# Patient Record
Sex: FEMALE | Race: WHITE | Employment: FULL TIME | URBAN - METROPOLITAN AREA
[De-identification: names, ages, dates, MRNs, and addresses within clinical notes are randomized per-mention and may not be internally consistent; named-entity substitution may affect disease eponyms.]

---

## 2019-07-03 ENCOUNTER — DOCUMENTATION ONLY (OUTPATIENT)
Dept: SURGERY | Facility: CLINIC | Age: 34
End: 2019-07-03

## 2019-07-03 NOTE — PROGRESS NOTES
ONCOLOGY INTAKE: Records Information      APPT INFORMATION:  Referring provider:  Dr. Humphrey Aranda  Referring provider s clinic:  [facility] Neurosurgery/ortho Spine  Reason for visit/diagnosis:  Schwanoma    ADDITIONAL INFORMATION:  Staff message sent to Aranza WILLIS with thoracic surgery to review records and provide scheduling recommendations.    Notes are in care everywhere also received via fax.  Please fax referral to oncology CSS team upon scheduling.

## 2019-07-09 ENCOUNTER — TELEPHONE (OUTPATIENT)
Dept: SURGERY | Facility: CLINIC | Age: 34
End: 2019-07-09

## 2019-07-09 DIAGNOSIS — D36.10 SCHWANNOMA: Primary | ICD-10-CM

## 2019-07-09 NOTE — TELEPHONE ENCOUNTER
RECORDS STATUS - ALL OTHER DIAGNOSIS      RECORDS RECEIVED FROM: [employer] (Pre[place]Visit says [facility], pt treats w/ Dr. Aranda @ , does not treat at [facility])   DATE RECEIVED:    NOTES STATUS DETAILS   OFFICE NOTE from referring provider Viola/BRANDI Aranda - BRANDI,    OFFICE NOTE from medical oncologist NA    DISCHARGE SUMMARY from hospital NA    DISCHARGE REPORT from the ER NA    OPERATIVE REPORT NA    MEDICATION LIST [facility]    CLINICAL TRIAL TREATMENTS TO DATE     LABS     PATHOLOGY REPORTS NA    ANYTHING RELATED TO DIAGNOSIS Epic 5/14/19   GENONOMIC TESTING     TYPE:     IMAGING (NEED IMAGES & REPORT)     [facility] SC[facility]    [facility], Report in CE - Requested Thoracic Spine: 5/29/19  Cervical Spine: 5/20/19   MAMMO NA    ULTRASOUND NA    PET NA    [facility], Report in CE - Requested Cervical Spine: 5/15/19  Shoulder Lt: 5/14/19  Chest: 5/14/19   EMG [facility] - [facility] 6/4/19   PFT Scheduled 7/17/19 @ [facility]

## 2019-07-09 NOTE — TELEPHONE ENCOUNTER
ONCOLOGY INTAKE: Records Information      APPT INFORMATION:  Referring provider:  Dr. Humphrey Aranda MD  Referring provider s clinic:  [facility]  Reason for visit/diagnosis:  Schwannoma  Has patient been notified of appointment date and time?: no, left message    RECORDS INFORMATION:  Were the records received with the referral (via [employer])? yes    ADDITIONAL INFORMATION:  Per staff message from Aranza WILLIS:  Aranza León APRN CNS [place], [place]             Patient can see one of the thoracic surgeons but will need a chest CT and PFTs prior.     Thanks   Aranza      Left message for patient to call back and schedule appointment.  Can get all three appointments completed on 7/17/2019 if calls back prior to then.  Please forward records/referral to Oncology CSS upon scheduling.

## 2019-07-09 NOTE — TELEPHONE ENCOUNTER
ONCOLOGY INTAKE: Records Information      APPT INFORMATION:  Referring provider:  Dr. Humphrey Aranda MD  Referring provider s clinic:  [facility]  Reason for visit/diagnosis:  Schwannoma  Has patient been notified of appointment date and time?: yes, per pt     RECORDS INFORMATION:  Were the records received with the referral (via [employer])? yes    Has patient been seen for any external appt for this diagnosis? yes    If yes, where? [facility]    Has patient had any imaging or procedures outside of [place] for this condition? na      If Yes, where? na    ADDITIONAL INFORMATION:  Per Aranza, pt needs a CT and PFT prior to appointment.  Appointment all scheduled and patient is aware of locations and times.  Referral forwarded to Oncology CSS team.

## 2019-07-16 NOTE — PROGRESS NOTES
THORACIC SURGERY - NEW PATIENT OFFICE VISIT     Dear Dr. Dilcia Fragoso,    I saw Ms. Ornelas at Dr. 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    HPI  Ms. Keri Ornelas is a 33 year-old female who presented with a 2-year history of intermittent left chest and inner arm pain and paresthesias. A CT scan was obtained which reveal a left apical tumor in the posterior sulcus. MRI was consistent with possible Schwannoma. She was referred for consideration of resection.     Previsit Tests   5/29/19 Thoracic MRI: Unable to see images.    Well-circumscribed 2.0 x 4.2 x 1.8 cm heterogenously enhancing mass lateral to the left T2-T3 neural foramen of the left thoracic apex, with appearance most suggestive of schwannoma with cystic degeneration.    7/17/2019 CT chest abdomen pelvis        PFTs 7/17/2019  FEV1: 3.48L, 111%  DLCO: 32.68, 149%  FEV1/FVC: 90%    PMH  Anxiety   Asthma   Deviated septum   Post-infectious glomerulonephritis 2008   Psoriasis   Seasonal allergies     PSH  ETOH socially  TOB never    Physical examination  BMI 24    From a personal perspective, she is a  at [employer]. She is here alone and lives in [place], [place].    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 I had a lengthy discussion with Ms Ornelas about her likely diagnosis and treatment options. I explained that given the imaging characteristics highly suggestive of Schwannoma and her symptoms, I would favor to proceed with resection at this time. It appears that the tumor does not involve the neural foramen however we are unable to see the images. I have requested an overread of the outside MRI by our radiologist. She is also scheduled to see Neurosurgery within our system. We talked about the rationale for surgery, the alternatives risks and benefits. We  also discussed that even after resection of the tumor, her chest and arm pain may not completely disappear. We talked about the expected hospital stay and potential complications including but not limited to bleeding, infection, damage to surrounding structures and need for reoperation. Informed consent was obtained and she agreed to proceed.     PLAN  I spent a total of 60 minutes with Ms. Ornelas, more than 50% of which were spent in counseling, coordination of care, and face-to-face time. I reviewed the plan as follows:  1) Provided there is no involvement of neural foramen proceed with planned surgery: Left VATS, resection of posterior sulcus tumor.   2) Necessary tests and appointments: Outside MRI overread. F/U with Neurosurgery as scheduled. PAC.  3) Anticoagulation plan: Post op enoxaparin.   4) Regional anesthesia plan: ES block vs paravertebral catheter.   All questions were answered and Keri Ornelas and present family were in agreement with the plan.  I appreciate the opportunity to participate in the care of your patient and will keep you updated.  Sincerely,

## 2019-07-17 ENCOUNTER — ANCILLARY PROCEDURE (OUTPATIENT)
Dept: CT IMAGING | Facility: CLINIC | Age: 34
End: 2019-07-17
Attending: CLINICAL NURSE SPECIALIST
Payer: COMMERCIAL

## 2019-07-17 ENCOUNTER — PRE VISIT (OUTPATIENT)
Dept: SURGERY | Facility: CLINIC | Age: 34
End: 2019-07-17

## 2019-07-17 ENCOUNTER — OFFICE VISIT (OUTPATIENT)
Dept: SURGERY | Facility: CLINIC | Age: 34
End: 2019-07-17
Attending: THORACIC SURGERY (CARDIOTHORACIC VASCULAR SURGERY)
Payer: COMMERCIAL

## 2019-07-17 VITALS
OXYGEN SATURATION: 97 % | HEART RATE: 79 BPM | DIASTOLIC BLOOD PRESSURE: 68 MMHG | SYSTOLIC BLOOD PRESSURE: 110 MMHG | HEIGHT: 63 IN | BODY MASS INDEX: 24.47 KG/M2 | RESPIRATION RATE: 16 BRPM | TEMPERATURE: 98.9 F | WEIGHT: 138.1 LBS

## 2019-07-17 DIAGNOSIS — D36.10 SCHWANNOMA: ICD-10-CM

## 2019-07-17 DIAGNOSIS — D36.10 SCHWANNOMA: Primary | ICD-10-CM

## 2019-07-17 PROCEDURE — G0463 HOSPITAL OUTPT CLINIC VISIT: HCPCS | Mod: ZF

## 2019-07-17 RX ORDER — CELECOXIB 100 MG/1
CAPSULE ORAL
COMMUNITY
Start: 2019-01-15 | End: 2019-07-30

## 2019-07-17 RX ORDER — GABAPENTIN 300 MG/1
CAPSULE ORAL
COMMUNITY
Start: 2019-05-14 | End: 2019-09-11

## 2019-07-17 RX ORDER — SPIRONOLACTONE 50 MG/1
100 TABLET, FILM COATED ORAL AT BEDTIME
COMMUNITY

## 2019-07-17 RX ORDER — SULFAMETHOXAZOLE/TRIMETHOPRIM 800-160 MG
TABLET ORAL
COMMUNITY
Start: 2019-01-21 | End: 2019-07-30

## 2019-07-17 RX ORDER — HYDROCODONE BITARTRATE AND ACETAMINOPHEN 5; 325 MG/1; MG/1
TABLET ORAL
COMMUNITY
Start: 2019-01-15 | End: 2019-07-30

## 2019-07-17 RX ORDER — KETOCONAZOLE 20 MG/ML
SHAMPOO TOPICAL
COMMUNITY
Start: 2019-05-09

## 2019-07-17 ASSESSMENT — MIFFLIN-ST. JEOR: SCORE: 1310.55

## 2019-07-17 ASSESSMENT — PAIN SCALES - GENERAL: PAINLEVEL: SEVERE PAIN (6)

## 2019-07-17 NOTE — NURSING NOTE
Oncology Rooming Note    July 17, 2019 3:06 PM   Keri Ornelas is a 33 year old female who presents for:    Chief Complaint   Patient presents with     Oncology Clinic Visit     Complete AEH schwannoma      Initial Vitals: There were no vitals taken for this visit. There is no height or weight on file to calculate BMI. There is no height or weight on file to calculate BSA.  Data Unavailable Comment: Data Unavailable   No LMP recorded.  Allergies reviewed: Yes  Medications reviewed: Yes    Medications: Medication refills not needed today.  Pharmacy name entered into EPIC: Data Unavailable    Clinical concerns: none        Melissa Ulisses, CMA

## 2019-07-18 ENCOUNTER — TELEPHONE (OUTPATIENT)
Dept: SURGERY | Facility: CLINIC | Age: 34
End: 2019-07-18

## 2019-07-18 NOTE — TELEPHONE ENCOUNTER
"I tried to reach patient, left VM with my direct # to call.   Will await her call back; I spoke to Dr. Lutz, it was decided to ask Dr. Jenaro Little (neurosurgery) to review MRI and give us his opinion regarding foramen involvement.   Epic message sent, will await his input.    Addendum:   Dr. Little reviewed images, sent message stating:  \"I think it is all extra-foraminal. I don't think you would need us at all.\"       Message shared with Dr. Lutz, and he gave approval to proceed with scheduling her procedure in OR soon, will see PAC prior.   I sent a message to our OR  and placed needed orders.    "

## 2019-07-19 LAB
DLCOUNC-%PRED-PRE: 149 %
DLCOUNC-PRE: 32.68 ML/MIN/MMHG
DLCOUNC-PRED: 21.81 ML/MIN/MMHG
ERV-%PRED-PRE: 25 %
ERV-PRE: 0.3 L
ERV-PRED: 1.18 L
EXPTIME-PRE: 4.59 SEC
FEF2575-%PRED-PRE: 135 %
FEF2575-PRE: 4.72 L/SEC
FEF2575-PRED: 3.48 L/SEC
FEFMAX-%PRED-PRE: 121 %
FEFMAX-PRE: 8.4 L/SEC
FEFMAX-PRED: 6.89 L/SEC
FEV1-%PRED-PRE: 111 %
FEV1-PRE: 3.48 L
FEV1FEV6-PRE: 91 %
FEV1FEV6-PRED: 85 %
FEV1FVC-PRE: 90 %
FEV1FVC-PRED: 85 %
FEV1SVC-PRE: 105 %
FEV1SVC-PRED: 82 %
FIFMAX-PRE: 4.78 L/SEC
FRCPLETH-%PRED-PRE: 97 %
FRCPLETH-PRE: 2.57 L
FRCPLETH-PRED: 2.63 L
FVC-%PRED-PRE: 104 %
FVC-PRE: 3.85 L
FVC-PRED: 3.69 L
IC-%PRED-PRE: 115 %
IC-PRE: 3 L
IC-PRED: 2.6 L
RVPLETH-%PRED-PRE: 161 %
RVPLETH-PRE: 2.26 L
RVPLETH-PRED: 1.4 L
TLCPLETH-%PRED-PRE: 115 %
TLCPLETH-PRE: 5.56 L
TLCPLETH-PRED: 4.81 L
VA-%PRED-PRE: 109 %
VA-PRE: 5.21 L
VC-%PRED-PRE: 87 %
VC-PRE: 3.3 L
VC-PRED: 3.78 L

## 2019-07-24 DIAGNOSIS — D36.10 SCHWANNOMA: Primary | ICD-10-CM

## 2019-07-26 NOTE — TELEPHONE ENCOUNTER
Action 7/26/2019 1:14pm  PP   Action Taken Called pt and LVM about recs.      Action 7/26/2019 1:16pm  PP   Action Taken Faxed request for recs to [facility].      Action 7/29/2019 8:48am  PP   Action Taken Pt LVM at 2:36pm on 7/26. No PCP. No other recs to collect.      Action 7/29/2019 2:04pm  PP   Action Taken Roberto received recs from [facility] and sent to [facility].        FUTURE VISIT INFORMATION      SURGERY INFORMATION:    Date: 8/8/2019    Location: [facility] OR    Surgeon:  Dr. Delmar Sim     Anesthesia Type:  General     RECORDS REQUESTED FROM:       Pertinent Medical History:  Asthma     Most recent EKG+ Tracing:  [facility] 4/21/2016 - Received    Most recent PFT's:  7/17/2019     Most recent Sleep Study:  [facility] 3/20/2013 - Received

## 2019-07-29 ENCOUNTER — TELEPHONE (OUTPATIENT)
Dept: SURGERY | Facility: CLINIC | Age: 34
End: 2019-07-29

## 2019-07-29 NOTE — TELEPHONE ENCOUNTER
Called patient to re-schedule procedure with Dr. Delmar Sim, there was no answer.  Left message with my direct line [phone].

## 2019-07-30 ENCOUNTER — OFFICE VISIT (OUTPATIENT)
Dept: SURGERY | Facility: CLINIC | Age: 34
End: 2019-07-30
Payer: COMMERCIAL

## 2019-07-30 ENCOUNTER — ANESTHESIA EVENT (OUTPATIENT)
Dept: SURGERY | Facility: CLINIC | Age: 34
DRG: 497 | End: 2019-07-30
Payer: COMMERCIAL

## 2019-07-30 ENCOUNTER — PRE VISIT (OUTPATIENT)
Dept: SURGERY | Facility: CLINIC | Age: 34
End: 2019-07-30

## 2019-07-30 VITALS
WEIGHT: 132 LBS | SYSTOLIC BLOOD PRESSURE: 124 MMHG | RESPIRATION RATE: 14 BRPM | HEART RATE: 83 BPM | DIASTOLIC BLOOD PRESSURE: 86 MMHG | HEIGHT: 63 IN | BODY MASS INDEX: 23.39 KG/M2 | TEMPERATURE: 98.3 F | OXYGEN SATURATION: 99 %

## 2019-07-30 DIAGNOSIS — D36.10 SCHWANNOMA: Primary | ICD-10-CM

## 2019-07-30 DIAGNOSIS — D36.10 SCHWANNOMA: ICD-10-CM

## 2019-07-30 LAB
ANION GAP SERPL CALCULATED.3IONS-SCNC: 3 MMOL/L (ref 3–14)
B-HCG SERPL-ACNC: <1 IU/L (ref 0–5)
BLD PROD TYP BPU: NORMAL
BLD PROD TYP BPU: NORMAL
BLD UNIT ID BPU: 0
BLD UNIT ID BPU: 0
BLOOD PRODUCT CODE: NORMAL
BLOOD PRODUCT CODE: NORMAL
BPU ID: NORMAL
BPU ID: NORMAL
BUN SERPL-MCNC: 20 MG/DL (ref 7–30)
CALCIUM SERPL-MCNC: 8.9 MG/DL (ref 8.5–10.1)
CHLORIDE SERPL-SCNC: 104 MMOL/L (ref 94–109)
CO2 SERPL-SCNC: 30 MMOL/L (ref 20–32)
CREAT SERPL-MCNC: 0.86 MG/DL (ref 0.52–1.04)
ERYTHROCYTE [DISTWIDTH] IN BLOOD BY AUTOMATED COUNT: 11.8 % (ref 10–15)
GFR SERPL CREATININE-BSD FRML MDRD: 90 ML/MIN/{1.73_M2}
GLUCOSE SERPL-MCNC: 96 MG/DL (ref 70–99)
HCT VFR BLD AUTO: 42.9 % (ref 35–47)
HGB BLD-MCNC: 14.6 G/DL (ref 11.7–15.7)
MCH RBC QN AUTO: 31.2 PG (ref 26.5–33)
MCHC RBC AUTO-ENTMCNC: 34 G/DL (ref 31.5–36.5)
MCV RBC AUTO: 92 FL (ref 78–100)
PLATELET # BLD AUTO: 291 10E9/L (ref 150–450)
POTASSIUM SERPL-SCNC: 3.5 MMOL/L (ref 3.4–5.3)
RBC # BLD AUTO: 4.68 10E12/L (ref 3.8–5.2)
SODIUM SERPL-SCNC: 137 MMOL/L (ref 133–144)
TRANSFUSION STATUS PATIENT QL: NORMAL
WBC # BLD AUTO: 8.8 10E9/L (ref 4–11)

## 2019-07-30 RX ORDER — CHLORHEXIDINE GLUCONATE ORAL RINSE 1.2 MG/ML
15 SOLUTION DENTAL ONCE
Status: CANCELLED | OUTPATIENT
Start: 2019-07-30 | End: 2019-07-30

## 2019-07-30 RX ORDER — CELECOXIB 200 MG/1
200 CAPSULE ORAL ONCE
Status: CANCELLED | OUTPATIENT
Start: 2019-07-30 | End: 2019-07-30

## 2019-07-30 RX ORDER — GABAPENTIN 300 MG/1
300 CAPSULE ORAL ONCE
Status: CANCELLED | OUTPATIENT
Start: 2019-07-30 | End: 2019-07-30

## 2019-07-30 RX ORDER — LORATADINE 10 MG/1
10 TABLET ORAL PRN
COMMUNITY

## 2019-07-30 RX ORDER — ACETAMINOPHEN 325 MG/1
975 TABLET ORAL ONCE
Status: CANCELLED | OUTPATIENT
Start: 2019-07-30 | End: 2019-07-30

## 2019-07-30 SDOH — HEALTH STABILITY: MENTAL HEALTH: HOW OFTEN DO YOU HAVE A DRINK CONTAINING ALCOHOL?: MONTHLY OR LESS

## 2019-07-30 ASSESSMENT — ENCOUNTER SYMPTOMS: SEIZURES: 0

## 2019-07-30 ASSESSMENT — LIFESTYLE VARIABLES: TOBACCO_USE: 0

## 2019-07-30 ASSESSMENT — MIFFLIN-ST. JEOR: SCORE: 1282.88

## 2019-07-30 NOTE — ANESTHESIA PREPROCEDURE EVALUATION
Anesthesia Pre-Procedure Evaluation    Patient: Keri Ornelas   MRN:     [id number] Gender:   female   Age:    33 year old :      1985        Preoperative Diagnosis: Left Lung Tumor   Procedure(s):  Left Video Assisted Thoracoscopic Resection Sulcus Tumor  Possible Thoracotomy     Past Medical History:   Diagnosis Date     Acute postinfectious glomerulonephritis      Allergic rhinitis      Anxiety      Asthma      Brittle hair      Psoriasis      Schwannoma       Past Surgical History:   Procedure Laterality Date     ADENOIDECTOMY  2005     MYRINGOTOMY, INSERT TUBE, COMBINED       SEPTOPLASTY, TURBINOPLASTY, COMBINED  2019          Anesthesia Evaluation     . Pt has had prior anesthetic. Type: General    No history of anesthetic complications          ROS/MED HX    ENT/Pulmonary:     (+)allergic rhinitis, Intermittent asthma , . Other pulmonary disease Left lung tumor.   (-) tobacco use   Neurologic:  - neg neurologic ROS    (-) seizures, CVA and migraines   Cardiovascular:  - neg cardiovascular ROS   (+) ----. : . . . :. . Previous cardiac testing date:results:date: results:ECG reviewed date:16 results: date: results:          METS/Exercise Tolerance: Comment: Patient swims, paddleboards and walks dog >4 METS   Hematologic:        (-) history of blood clots, anemia and History of Transfusion   Musculoskeletal:  - neg musculoskeletal ROS       GI/Hepatic:  - neg GI/hepatic ROS      (-) GERD   Renal/Genitourinary: Comment: History of post infectious glomerulonephritis in      (-) renal disease   Endo:  - neg endo ROS       Psychiatric:     (+) psychiatric history anxiety      Infectious Disease:  - neg infectious disease ROS       Malignancy:      - no malignancy   Other: Comment: Occasional psoriasis    (+) Possibly pregnant LMP: 21, no H/O Chronic Pain,no other significant disability                        PHYSICAL EXAM:   Mental Status/Neuro: A/A/O; Age Appropriate   Airway: Facies:  "Feasible  Mallampati: I  Mouth/Opening: Full  TM distance: > 6 cm  Neck ROM: Full   Respiratory: Auscultation: CTAB     Resp. Rate: Normal     Resp. Effort: Normal      CV: Rhythm: Regular  Heart: Normal Sounds  Edema: None  Pulses: Normal   Comments:                      Results for SHERIDAN SCHMID (MRN [id number]) as of 7/30/2019 15:19   Ref. Range 7/30/2019 14:10   Sodium Latest Ref Range: 133 - 144 mmol/L 137   Potassium Latest Ref Range: 3.4 - 5.3 mmol/L 3.5   Chloride Latest Ref Range: 94 - 109 mmol/L 104   Carbon Dioxide Latest Ref Range: 20 - 32 mmol/L 30   Urea Nitrogen Latest Ref Range: 7 - 30 mg/dL 20   Creatinine Latest Ref Range: 0.52 - 1.04 mg/dL 0.86   GFR Estimate Latest Ref Range: >60 mL/min/1.73_m2 90   GFR Estimate If Black Latest Ref Range: >60 mL/min/1.73_m2 >90   Calcium Latest Ref Range: 8.5 - 10.1 mg/dL 8.9   Anion Gap Latest Ref Range: 3 - 14 mmol/L 3   HCG Quantitative Serum Latest Ref Range: 0 - 5 IU/L <1   Glucose Latest Ref Range: 70 - 99 mg/dL 96   WBC Latest Ref Range: 4.0 - 11.0 10e9/L 8.8   Hemoglobin Latest Ref Range: 11.7 - 15.7 g/dL 14.6   Hematocrit Latest Ref Range: 35.0 - 47.0 % 42.9   Platelet Count Latest Ref Range: 150 - 450 10e9/L 291   RBC Count Latest Ref Range: 3.8 - 5.2 10e12/L 4.68   MCV Latest Ref Range: 78 - 100 fl 92   MCH Latest Ref Range: 26.5 - 33.0 pg 31.2   MCHC Latest Ref Range: 31.5 - 36.5 g/dL 34.0   RDW Latest Ref Range: 10.0 - 15.0 % 11.8     Preop Vitals    BP Readings from Last 3 Encounters:   07/30/19 124/86   07/17/19 110/68    Pulse Readings from Last 3 Encounters:   07/30/19 83   07/17/19 79      Resp Readings from Last 3 Encounters:   07/30/19 14   07/17/19 16    SpO2 Readings from Last 3 Encounters:   07/30/19 99%   07/17/19 97%      Temp Readings from Last 1 Encounters:   07/30/19 98.3  F (36.8  C) (Oral)    Ht Readings from Last 1 Encounters:   07/30/19 1.6 m (5' 3\")      Wt Readings from Last 1 Encounters:   07/30/19 59.9 kg (132 lb)    Estimated " "body mass index is 23.38 kg/m  as calculated from the following:    Height as of this encounter: 1.6 m (5' 3\").    Weight as of this encounter: 59.9 kg (132 lb).     LDA:        Assessment:   ASA SCORE: 2    H&P: History and physical reviewed and following examination; no interval change.   Smoking Status:  Non-Smoker/Unknown   NPO Status: NPO Appropriate     Plan:   Anes. Type:  General   Pre-Medication: Midazolam   Induction:  IV (Standard)   Airway: ETT; Oral   Access/Monitoring: PIV; 2nd PIV (clearsite)   Maintenance: Balanced     Postop Plan:   Postop Pain: Opioids; Regional  Postop Sedation/Airway: Not planned  Disposition: Inpatient/Admit     PONV Management:   Adult Risk Factors: Female, Non-Smoker, Postop Opioids     CONSENT: Direct conversation   Plan and risks discussed with: Patient   Blood Products: Consent Deferred (Minimal Blood Loss)                PAC Discussion and Assessment    ASA Classification: 2  Case is suitable for: [place]  Anesthetic techniques and relevant risks discussed: GA with regional block for post-op pain control  Invasive monitoring and risk discussed:   Types:   Possibility and Risk of blood transfusion discussed:   NPO instructions given:   Additional anesthetic preparation and risks discussed:   Needs early admission to pre-op area:   Other:     PAC Resident/NP Anesthesia Assessment:  Keri is a 33 year old woman who is scheduled for left video assisted thoracoscopic resection sulcus tumor, possible thoracotomy on 8/12/19 by Dr. Bolivar Sim in treatment of left lung tumor.  PAC referral for risk assessment and optimization for anesthesia with comorbid conditions of asthma, allergies, history of post infectious glomerulonephritis, anxiety and psoriasis:    Pre-operative considerations:  1.  Cardiac:  Functional status- METS >4.  High risk surgery with 0.9% (RCRI #) risk of major adverse cardiac event.   ~ The patient had EKG on 4/21/16 with normal sinus rhythm. She is very " active without issues. No further cardiac testing indicated.     2.  Pulm:  Airway feasible.  VANESSA risk: Low  ~ Allergies - the patient can continue claritin  ~ Asthma - the patient has no inhalers and denies any symptoms of wheezing or coughing. She had normal PFTs on 7/17/19. FEV1 3.48L and FEV1/FVC 90%  ~ left lung tumor - procedure as above. Continue Neurontin for pain associated with tumor.     3. ENT: The patient has brittle hair - hold aldactone the DOS    4. GI:  Risk of PONV score = 3.  If > 2, anti-emetic intervention recommended.    5. : History of post infectious glomerulonephritis in 2008 - No ongoing issues. Normal creatinine in 4/2019. She denies any current urinary symptoms.     6. Psych: Anxiety - no ongoing medications.       VTE risk: 0.26%    Patient is optimized and is acceptable candidate for the proposed procedure.  No further diagnostic evaluation is needed.     Patient discussed with Dr. Biggs    For further details of assessment, testing, and physical exam please see H and P completed on same date.    Aminta COFFMAN-      Mid-Level Provider/Resident: Aminta Wilson PA-C  Date: 7/30/19  Time:     Attending Anesthesiologist Anesthesia Assessment:  Otherwise healthy 33 year old for left VATs for resection of sulcus tumor (schwannoma). No significant other medical disease.     Patient/case discussed with AISLINN/resident; agree with above assessment. No need to see patient. Patient is appropriate for the planned procedure without further work-up or medical management.    Neyda Biggs MD        Anesthesiologist:   Date:   Time:   Pass/Fail:   Disposition:     PAC Pharmacist Assessment:        Pharmacist:   Date:   Time:    Aminta Wilson PA-C

## 2019-07-30 NOTE — TELEPHONE ENCOUNTER
Spoke with patient to schedule procedure with Dr. Delmar Sim    Procedure was scheduled on 08/12 at [facility] OR  Patient will have H&P with PAC  Patient is aware a / is needed day of surgery.   Surgery letter was sent via [facility] [employer], patient has my direct contact information for any further questions.

## 2019-07-30 NOTE — H&P
Pre-Operative H & P     CC:  Preoperative exam to assess for increased cardiopulmonary risk while undergoing surgery and anesthesia.    Date of Encounter: 7/30/2019  Primary Care Physician:  No primary care provider on file.     Reason for visit: pre operative examination, Schwannoma    HPI  Keri Ornelas is a 33 year old female who presents for pre-operative H & P in preparation for Left video-assisted thoracoscopic resection of posterior sulcus tumor, possible thoracotomy with Dr. Bolivar Sim on 8/12/19 at [facility].     The patient is a 33 year old woman who had a 2 year history of intermittent left chest and inner arm pain. She has associated paresthesia down her left arm as well. She saw her dermatologist for brittle hair who then referred her to endocrinologist. She was then referred to rheumatologist followed by neurologist. She eventually had a CT scan which showed a left apical tumor in the posterior sulcus. She had a follow up MRI which was consistent with Schwannoma. She met with Dr. Bolivar Sim on 7/17/19. Dr. Little also reviewed the MRI and the patient is now scheduled for the procedure as above.     History is obtained from the patient and chart review    Past Medical History  Past Medical History:   Diagnosis Date     Acute postinfectious glomerulonephritis      Allergic rhinitis      Anxiety      Asthma      Brittle hair      Psoriasis      Schwannoma        Past Surgical History  Past Surgical History:   Procedure Laterality Date     ADENOIDECTOMY  2005     MYRINGOTOMY, INSERT TUBE, COMBINED       SEPTOPLASTY, TURBINOPLASTY, COMBINED  01/2019       Hx of Blood transfusions/reactions: none     Hx of abnormal bleeding or anti-platelet use: non    Menstrual history: Patient's last menstrual period was 07/19/2019 (exact date).:     Steroid use in the last year: none    Personal or FH with difficulty with Anesthesia:  Denies, reports mother has been slow  to wake with anesthesia    Prior to Admission Medications  Current Outpatient Medications   Medication Sig Dispense Refill     gabapentin (NEURONTIN) 300 MG capsule Take one pill at bedtime       ketoconazole (NIZORAL) 2 % external shampoo        loratadine (CLARITIN) 10 MG tablet Take 10 mg by mouth as needed for allergies       spironolactone (ALDACTONE) 50 MG tablet Take 100 mg by mouth At Bedtime          Allergies  Allergies   Allergen Reactions     Cefaclor Rash and Unknown       Social History  Social History     Socioeconomic History     Marital status: Single     Spouse name: Not on file     Number of children: Not on file     Years of education: Not on file     Highest education level: Not on file   Occupational History     Not on file   Social Needs     Financial resource strain: Not on file     Food insecurity:     Worry: Not on file     Inability: Not on file     Transportation needs:     Medical: Not on file     Non-medical: Not on file   Tobacco Use     Smoking status: Never Smoker     Smokeless tobacco: Never Used   Substance and Sexual Activity     Alcohol use: Yes     Frequency: Monthly or less     Drug use: Never     Sexual activity: Not on file   Lifestyle     Physical activity:     Days per week: Not on file     Minutes per session: Not on file     Stress: Not on file   Relationships     Social connections:     Talks on phone: Not on file     Gets together: Not on file     Attends Anglican service: Not on file     Active member of club or organization: Not on file     Attends meetings of clubs or organizations: Not on file     Relationship status: Not on file     Intimate partner violence:     Fear of current or ex partner: Not on file     Emotionally abused: Not on file     Physically abused: Not on file     Forced sexual activity: Not on file   Other Topics Concern     Not on file   Social History Narrative     Not on file       Family History  Family History   Problem Relation Age of Onset  "    Other - See Comments Mother         slow to wake with anesthesia     Heart Disease Maternal Grandfather        Review of Systems  ROS/MED HX    ENT/Pulmonary:     (+)allergic rhinitis, Intermittent asthma , . Other pulmonary disease Left lung tumor.   (-) tobacco use   Neurologic:  - neg neurologic ROS    (-) seizures, CVA and migraines   Cardiovascular:  - neg cardiovascular ROS   (+) ----. : . . . :. . Previous cardiac testing date:results:date: results:ECG reviewed date:4/21/16 results: date: results:          METS/Exercise Tolerance: Comment: Patient swims, paddleboards and walks dog >4 METS   Hematologic:        (-) history of blood clots, anemia and History of Transfusion   Musculoskeletal:  - neg musculoskeletal ROS       GI/Hepatic:  - neg GI/hepatic ROS      (-) GERD   Renal/Genitourinary: Comment: History of post infectious glomerulonephritis in 2008     (-) renal disease   Endo:  - neg endo ROS       Psychiatric:     (+) psychiatric history anxiety      Infectious Disease:  - neg infectious disease ROS       Malignancy:      - no malignancy   Other: Comment: Occasional psoriasis    (+) Possibly pregnant LMP: 7/19/19, no H/O Chronic Pain,no other significant disability            The complete review of systems is negative other than noted in the HPI or here.   Temp: 98.3  F (36.8  C) Temp src: Oral BP: 124/86 Pulse: 83   Resp: 14 SpO2: 99 %         132 lbs 0 oz  5' 3\"   Body mass index is 23.38 kg/m .       Physical Exam  Constitutional: Awake, alert, cooperative, no apparent distress, and appears stated age.  Eyes: Pupils equal, round and reactive to light, extra ocular muscles intact, sclera clear, conjunctiva normal.  HENT: Normocephalic, oral pharynx with moist mucus membranes, good dentition. No goiter appreciated.   Respiratory: Clear to auscultation bilaterally, no crackles or wheezing.  Cardiovascular: Regular rate and rhythm, normal S1 and S2, and no murmur noted.  Carotids +2, no bruits. No " edema. Palpable pulses to radial  DP and PT arteries.   GI: Normal bowel sounds, soft, non-distended, non-tender, no masses palpated, no hepatosplenomegaly.    Lymph/Hematologic: No cervical lymphadenopathy and no supraclavicular lymphadenopathy.  Genitourinary:  defer  Skin: Warm and dry.  No rashes at anticipated surgical site.   Musculoskeletal: Full ROM of neck. There is no redness, warmth, or swelling of the joints. Gross motor strength is normal.    Neurologic: Awake, alert, oriented to name, place and time. Cranial nerves II-XII are grossly intact. Gait is normal.   Neuropsychiatric: Calm, cooperative. Normal affect.     Labs: (personally reviewed)  Results for SHERIDAN SCHMID (MRN [id number]) as of 2019 15:19   Ref. Range 2019 14:10   Sodium Latest Ref Range: 133 - 144 mmol/L 137   Potassium Latest Ref Range: 3.4 - 5.3 mmol/L 3.5   Chloride Latest Ref Range: 94 - 109 mmol/L 104   Carbon Dioxide Latest Ref Range: 20 - 32 mmol/L 30   Urea Nitrogen Latest Ref Range: 7 - 30 mg/dL 20   Creatinine Latest Ref Range: 0.52 - 1.04 mg/dL 0.86   GFR Estimate Latest Ref Range: >60 mL/min/1.73_m2 90   GFR Estimate If Black Latest Ref Range: >60 mL/min/1.73_m2 >90   Calcium Latest Ref Range: 8.5 - 10.1 mg/dL 8.9   Anion Gap Latest Ref Range: 3 - 14 mmol/L 3   HCG Quantitative Serum Latest Ref Range: 0 - 5 IU/L <1   Glucose Latest Ref Range: 70 - 99 mg/dL 96   WBC Latest Ref Range: 4.0 - 11.0 10e9/L 8.8   Hemoglobin Latest Ref Range: 11.7 - 15.7 g/dL 14.6   Hematocrit Latest Ref Range: 35.0 - 47.0 % 42.9   Platelet Count Latest Ref Range: 150 - 450 10e9/L 291   RBC Count Latest Ref Range: 3.8 - 5.2 10e12/L 4.68   MCV Latest Ref Range: 78 - 100 fl 92   MCH Latest Ref Range: 26.5 - 33.0 pg 31.2   MCHC Latest Ref Range: 31.5 - 36.5 g/dL 34.0   RDW Latest Ref Range: 10.0 - 15.0 % 11.8     EK16  Sinus rhythm    EXAMINATION: Chest CT  2019 1:45 PM  IMPRESSION:  Well-circumscribed low density extrapleural  lesion in the left apex  measuring up to 3.7 cm, consistent with patient's known schwannoma.  Given differences in technique and positioning, this is grossly stable  from prior.    PFTs 7/17/19  IMPRESSION:  Normal Pulmonary Function  No previous testing available for comparison.  Qi Dewey MD    The patient's records and results personally reviewed by this provider.     Outside records reviewed from: care everywhere     ASSESSMENT and PLAN  Keri is a 33 year old woman who is scheduled for left video assisted thoracoscopic resection sulcus tumor, possible thoracotomy on 8/12/19 by Dr. Bolivar Sim in treatment of left lung tumor.  PAC referral for risk assessment and optimization for anesthesia with comorbid conditions of asthma, allergies, history of post infectious glomerulonephritis, anxiety and psoriasis:    Pre-operative considerations:  1.  Cardiac:  Functional status- METS >4.  High risk surgery with 0.9% (RCRI #) risk of major adverse cardiac event.   ~ The patient had EKG on 4/21/16 with normal sinus rhythm. She is very active without issues. No further cardiac testing indicated.     2.  Pulm:  Airway feasible.  VANESSA risk: Low  ~ Allergies - the patient can continue claritin  ~ Asthma - the patient has no inhalers and denies any symptoms of wheezing or coughing. She had normal PFTs on 7/17/19. FEV1 3.48L and FEV1/FVC 90%  ~ left lung tumor - procedure as above. Continue Neurontin for pain associated with tumor.     3. ENT: The patient has brittle hair - hold aldactone the DOS    4. GI:  Risk of PONV score = 3.  If > 2, anti-emetic intervention recommended.    5. : History of post infectious glomerulonephritis in 2008 - No ongoing issues. Normal creatinine in 4/2019. She denies any current urinary symptoms.     6. Psych: Anxiety - no ongoing medications.       VTE risk: 0.26%    Patient was discussed with Dr Biggs.    The patient is optimized for their procedure. AVS with information on surgery  time/arrival time, meds and NPO status given by nursing staff.        Aminta Wilson PA-C  Preoperative Assessment Center  [facility]  Phone: [phone]  Fax: [phone]

## 2019-07-30 NOTE — PATIENT INSTRUCTIONS
Preparing for Your Surgery      Name:  Keri Ornelas   MRN:  [id number]   :  1985   Today's Date:  2019     Arriving for surgery:  Surgery date:  19  Arrival time:  09:00 am  Please come to:       [facility] [address]  [address] [place]  [address]    - ? parking is available in front of the hospital      -    Please proceed to [address] on the 3rd floor. [phone]?     - ?If you are in need of directions, wheelchair or escort please stop at the Information Desk in the lobby.  Inform the information person that you are here for surgery; a wheelchair and escort to [address] will be provided.?     What can I eat or drink?  -  You may have solid food or milk products until 8 hours prior to your surgery.  -  You may have water, apple juice or 7up/Sprite until 2 hours prior to your surgery.    Which medicines can I take?  Stop Aspirin, vitamins and supplements one week prior to surgery.  Hold Ibuprofen for 24 hours and/or Naproxen for 48 hours prior to surgery.   -  Do NOT take these medications in the morning, the day of surgery:  Aldactone (spironolactone) if normally taken in the morning.  -  Please take these medications the day of surgery:  Tylenol if needed; take scheduled medications normally taken in the morning.    How do I prepare myself?  -  Take two showers: one the night before surgery; and one the morning of surgery.         Use Scrubcare or Hibiclens to wash from neck down.  You may use your own     shampoo and conditioner. No other hair products.   -  Do NOT use lotion, powder, deodorant, or antiperspirant the day of your surgery.  -  Do NOT wear any makeup, fingernail polish or jewelry.  -  Begin using Incentive Spirometer 1 week prior to surgery.  Use 4 times per day, up    to 5 breaths each time.  Bring Incentive Spirometer to hospital.  - Do not bring your own medications to the hospital, except for inhalers and eye   drops.  -  Bring  your ID and insurance card.        Questions or Concerns:  -If you have questions or concerns regarding the day of surgery, please call [phone].   -For questions after surgery please call your surgeons office.     Enhanced Recovery After Surgery     This is a team effort, including you, to get you back on your feet, eating and drinking normally and out of the hospital as quickly as possible.  The goals are: 1) NO INFECTIONS and   2) RETURN TO NORMAL DIET    How can we achieve these goals?  1) STAY ACTIVE: Walk every day before your surgery; try to increase the amount every day.  Walk after surgery as much as you can-the nurses will help you.  Walking speeds healing and gets you home quicker, you heal better at home and have less risk of infection.     2) INCENTIVE SPIROMETER: Practice your incentive spirometer 4 times per day with 5 repetitions each time.  Using the incentive spirometer can strengthen your muscles between your ribs and help you have a strong cough after surgery.  A more effective cough can help prevent problems with your lungs.    3) STAY HYDRATED: Drink clear liquids up until 2 hours before your surgery. We would like you to purchase a drink such as Gatorade or Ensure Clear (not the milkshake type).  Drink this before bedtime and on the way into the hospital, drink between 8-10 ounces or until you feel hydrated.  Keeping well hydrated leads to your veins being plump, you wake up faster, and you are less likely to be nauseated. Start drinking water as soon as you can after surgery and advance to clear liquids and food as tolerated.  IV fluids contain salt, drinking fluids will minimize the amount of IV fluids you need and decrease the amount of salt you get.    The most common reason for the patient to be readmitted is dehydration. Staying hydrated after you go home from the hospital is very important.  Ensure or Ensure Clear are good options to keep you hydrated.     4) PAIN MANAGEMENT: If we  minimize the amount of opioids and narcotics, and use regional blocks (which numb the area where your surgery is) along with oral pain medications; you will have less side effects of nausea and constipation. Narcotics can slow down your bowels and cause you to stay in the hospital longer.     Our goal is to keep you comfortable; eating and drinking normally and back home safely.

## 2019-08-12 ENCOUNTER — HOSPITAL ENCOUNTER (INPATIENT)
Facility: CLINIC | Age: 34
LOS: 2 days | Discharge: HOME | DRG: 497 | End: 2019-08-14
Attending: THORACIC SURGERY (CARDIOTHORACIC VASCULAR SURGERY) | Admitting: THORACIC SURGERY (CARDIOTHORACIC VASCULAR SURGERY)
Payer: COMMERCIAL

## 2019-08-12 ENCOUNTER — APPOINTMENT (OUTPATIENT)
Dept: GENERAL RADIOLOGY | Facility: CLINIC | Age: 34
DRG: 497 | End: 2019-08-12
Attending: THORACIC SURGERY (CARDIOTHORACIC VASCULAR SURGERY)
Payer: COMMERCIAL

## 2019-08-12 ENCOUNTER — ANESTHESIA (OUTPATIENT)
Dept: SURGERY | Facility: CLINIC | Age: 34
DRG: 497 | End: 2019-08-12
Payer: COMMERCIAL

## 2019-08-12 DIAGNOSIS — D36.10 SCHWANNOMA: Primary | ICD-10-CM

## 2019-08-12 LAB
ABO + RH BLD: NORMAL
ABO + RH BLD: NORMAL
BLD GP AB SCN SERPL QL: NORMAL
BLD PROD TYP BPU: NORMAL
BLOOD BANK CMNT PATIENT-IMP: NORMAL
CREAT SERPL-MCNC: 0.67 MG/DL (ref 0.52–1.04)
GFR SERPL CREATININE-BSD FRML MDRD: >90 ML/MIN/{1.73_M2}
GLUCOSE BLDC GLUCOMTR-MCNC: 88 MG/DL (ref 70–99)
HCG UR QL: NEGATIVE
HGB BLD-MCNC: 13.7 G/DL (ref 11.7–15.7)
NUM BPU REQUESTED: 2
PLATELET # BLD AUTO: 231 10E9/L (ref 150–450)
POTASSIUM SERPL-SCNC: 3.6 MMOL/L (ref 3.4–5.3)
SPECIMEN EXP DATE BLD: NORMAL

## 2019-08-12 PROCEDURE — 71000014 ZZH RECOVERY PHASE 1 LEVEL 2 FIRST HR: Performed by: THORACIC SURGERY (CARDIOTHORACIC VASCULAR SURGERY)

## 2019-08-12 PROCEDURE — 37000008 ZZH ANESTHESIA TECHNICAL FEE, 1ST 30 MIN: Performed by: THORACIC SURGERY (CARDIOTHORACIC VASCULAR SURGERY)

## 2019-08-12 PROCEDURE — 0WB84ZZ EXCISION OF CHEST WALL, PERCUTANEOUS ENDOSCOPIC APPROACH: ICD-10-PCS | Performed by: THORACIC SURGERY (CARDIOTHORACIC VASCULAR SURGERY)

## 2019-08-12 PROCEDURE — 25000125 ZZHC RX 250: Performed by: SURGERY

## 2019-08-12 PROCEDURE — 82565 ASSAY OF CREATININE: CPT | Performed by: SURGERY

## 2019-08-12 PROCEDURE — 25000128 H RX IP 250 OP 636: Performed by: SURGERY

## 2019-08-12 PROCEDURE — 27210794 ZZH OR GENERAL SUPPLY STERILE: Performed by: THORACIC SURGERY (CARDIOTHORACIC VASCULAR SURGERY)

## 2019-08-12 PROCEDURE — 25000132 ZZH RX MED GY IP 250 OP 250 PS 637: Performed by: SURGERY

## 2019-08-12 PROCEDURE — 12000001 ZZH R&B MED SURG/OB UMMC

## 2019-08-12 PROCEDURE — 94640 AIRWAY INHALATION TREATMENT: CPT

## 2019-08-12 PROCEDURE — 85018 HEMOGLOBIN: CPT | Performed by: ANESTHESIOLOGY

## 2019-08-12 PROCEDURE — 25000125 ZZHC RX 250: Performed by: ANESTHESIOLOGY

## 2019-08-12 PROCEDURE — 36000062 ZZH SURGERY LEVEL 4 1ST 30 MIN - UMMC: Performed by: THORACIC SURGERY (CARDIOTHORACIC VASCULAR SURGERY)

## 2019-08-12 PROCEDURE — 71000015 ZZH RECOVERY PHASE 1 LEVEL 2 EA ADDTL HR: Performed by: THORACIC SURGERY (CARDIOTHORACIC VASCULAR SURGERY)

## 2019-08-12 PROCEDURE — 88309 TISSUE EXAM BY PATHOLOGIST: CPT | Performed by: THORACIC SURGERY (CARDIOTHORACIC VASCULAR SURGERY)

## 2019-08-12 PROCEDURE — 40000275 ZZH STATISTIC RCP TIME EA 10 MIN

## 2019-08-12 PROCEDURE — 37000009 ZZH ANESTHESIA TECHNICAL FEE, EACH ADDTL 15 MIN: Performed by: THORACIC SURGERY (CARDIOTHORACIC VASCULAR SURGERY)

## 2019-08-12 PROCEDURE — 85049 AUTOMATED PLATELET COUNT: CPT | Performed by: SURGERY

## 2019-08-12 PROCEDURE — 25000128 H RX IP 250 OP 636: Performed by: STUDENT IN AN ORGANIZED HEALTH CARE EDUCATION/TRAINING PROGRAM

## 2019-08-12 PROCEDURE — 25800030 ZZH RX IP 258 OP 636: Performed by: NURSE ANESTHETIST, CERTIFIED REGISTERED

## 2019-08-12 PROCEDURE — 25000125 ZZHC RX 250: Performed by: NURSE ANESTHETIST, CERTIFIED REGISTERED

## 2019-08-12 PROCEDURE — 25000132 ZZH RX MED GY IP 250 OP 250 PS 637: Performed by: PHYSICIAN ASSISTANT

## 2019-08-12 PROCEDURE — 36000064 ZZH SURGERY LEVEL 4 EA 15 ADDTL MIN - UMMC: Performed by: THORACIC SURGERY (CARDIOTHORACIC VASCULAR SURGERY)

## 2019-08-12 PROCEDURE — 36415 COLL VENOUS BLD VENIPUNCTURE: CPT | Performed by: SURGERY

## 2019-08-12 PROCEDURE — 84132 ASSAY OF SERUM POTASSIUM: CPT | Performed by: ANESTHESIOLOGY

## 2019-08-12 PROCEDURE — 40000171 ZZH STATISTIC PRE-PROCEDURE ASSESSMENT III: Performed by: THORACIC SURGERY (CARDIOTHORACIC VASCULAR SURGERY)

## 2019-08-12 PROCEDURE — 25000128 H RX IP 250 OP 636: Performed by: NURSE ANESTHETIST, CERTIFIED REGISTERED

## 2019-08-12 PROCEDURE — 88342 IMHCHEM/IMCYTCHM 1ST ANTB: CPT | Performed by: THORACIC SURGERY (CARDIOTHORACIC VASCULAR SURGERY)

## 2019-08-12 PROCEDURE — 25800030 ZZH RX IP 258 OP 636: Performed by: SURGERY

## 2019-08-12 PROCEDURE — 27110038 ZZH RX 271: Performed by: ANESTHESIOLOGY

## 2019-08-12 PROCEDURE — 81025 URINE PREGNANCY TEST: CPT | Performed by: ANESTHESIOLOGY

## 2019-08-12 PROCEDURE — 40000986 XR CHEST PORT 1 VW

## 2019-08-12 PROCEDURE — 25000128 H RX IP 250 OP 636: Performed by: ANESTHESIOLOGY

## 2019-08-12 PROCEDURE — 00000146 ZZHCL STATISTIC GLUCOSE BY METER IP

## 2019-08-12 PROCEDURE — 25000565 ZZH ISOFLURANE, EA 15 MIN: Performed by: THORACIC SURGERY (CARDIOTHORACIC VASCULAR SURGERY)

## 2019-08-12 PROCEDURE — 25800030 ZZH RX IP 258 OP 636: Performed by: ANESTHESIOLOGY

## 2019-08-12 PROCEDURE — 0PB14ZZ EXCISION OF 1 TO 2 RIBS, PERCUTANEOUS ENDOSCOPIC APPROACH: ICD-10-PCS | Performed by: THORACIC SURGERY (CARDIOTHORACIC VASCULAR SURGERY)

## 2019-08-12 PROCEDURE — 88341 IMHCHEM/IMCYTCHM EA ADD ANTB: CPT | Performed by: THORACIC SURGERY (CARDIOTHORACIC VASCULAR SURGERY)

## 2019-08-12 RX ORDER — CLINDAMYCIN PHOSPHATE 900 MG/50ML
INJECTION, SOLUTION INTRAVENOUS PRN
Status: DISCONTINUED | OUTPATIENT
Start: 2019-08-12 | End: 2019-08-12

## 2019-08-12 RX ORDER — FENTANYL CITRATE 50 UG/ML
25-50 INJECTION, SOLUTION INTRAMUSCULAR; INTRAVENOUS
Status: DISCONTINUED | OUTPATIENT
Start: 2019-08-12 | End: 2019-08-12 | Stop reason: HOSPADM

## 2019-08-12 RX ORDER — FLUMAZENIL 0.1 MG/ML
0.2 INJECTION, SOLUTION INTRAVENOUS
Status: DISCONTINUED | OUTPATIENT
Start: 2019-08-12 | End: 2019-08-12 | Stop reason: HOSPADM

## 2019-08-12 RX ORDER — PROCHLORPERAZINE MALEATE 5 MG
10 TABLET ORAL EVERY 6 HOURS PRN
Status: DISCONTINUED | OUTPATIENT
Start: 2019-08-12 | End: 2019-08-14 | Stop reason: HOSPADM

## 2019-08-12 RX ORDER — ONDANSETRON 2 MG/ML
4 INJECTION INTRAMUSCULAR; INTRAVENOUS EVERY 6 HOURS PRN
Status: DISCONTINUED | OUTPATIENT
Start: 2019-08-12 | End: 2019-08-12

## 2019-08-12 RX ORDER — LORATADINE 10 MG/1
10 TABLET ORAL DAILY
Status: DISCONTINUED | OUTPATIENT
Start: 2019-08-12 | End: 2019-08-14 | Stop reason: HOSPADM

## 2019-08-12 RX ORDER — CHLORHEXIDINE GLUCONATE ORAL RINSE 1.2 MG/ML
15 SOLUTION DENTAL ONCE
Status: COMPLETED | OUTPATIENT
Start: 2019-08-12 | End: 2019-08-12

## 2019-08-12 RX ORDER — DIPHENHYDRAMINE HYDROCHLORIDE 50 MG/ML
25 INJECTION INTRAMUSCULAR; INTRAVENOUS EVERY 6 HOURS PRN
Status: DISCONTINUED | OUTPATIENT
Start: 2019-08-12 | End: 2019-08-14 | Stop reason: HOSPADM

## 2019-08-12 RX ORDER — AMOXICILLIN 250 MG
2 CAPSULE ORAL 2 TIMES DAILY
Status: DISCONTINUED | OUTPATIENT
Start: 2019-08-12 | End: 2019-08-12

## 2019-08-12 RX ORDER — ONDANSETRON 4 MG/1
4 TABLET, ORALLY DISINTEGRATING ORAL EVERY 6 HOURS PRN
Status: DISCONTINUED | OUTPATIENT
Start: 2019-08-12 | End: 2019-08-12

## 2019-08-12 RX ORDER — NALOXONE HYDROCHLORIDE 0.4 MG/ML
.1-.4 INJECTION, SOLUTION INTRAMUSCULAR; INTRAVENOUS; SUBCUTANEOUS
Status: DISCONTINUED | OUTPATIENT
Start: 2019-08-12 | End: 2019-08-12

## 2019-08-12 RX ORDER — SODIUM CHLORIDE, SODIUM LACTATE, POTASSIUM CHLORIDE, CALCIUM CHLORIDE 600; 310; 30; 20 MG/100ML; MG/100ML; MG/100ML; MG/100ML
INJECTION, SOLUTION INTRAVENOUS CONTINUOUS
Status: DISCONTINUED | OUTPATIENT
Start: 2019-08-12 | End: 2019-08-13

## 2019-08-12 RX ORDER — GABAPENTIN 300 MG/1
300 CAPSULE ORAL ONCE
Status: COMPLETED | OUTPATIENT
Start: 2019-08-12 | End: 2019-08-12

## 2019-08-12 RX ORDER — LIDOCAINE 4 G/G
1 PATCH TOPICAL
Status: DISCONTINUED | OUTPATIENT
Start: 2019-08-13 | End: 2019-08-14 | Stop reason: HOSPADM

## 2019-08-12 RX ORDER — OXYCODONE HYDROCHLORIDE 5 MG/1
5-10 TABLET ORAL
Status: DISCONTINUED | OUTPATIENT
Start: 2019-08-12 | End: 2019-08-14 | Stop reason: HOSPADM

## 2019-08-12 RX ORDER — ONDANSETRON 2 MG/ML
4 INJECTION INTRAMUSCULAR; INTRAVENOUS EVERY 30 MIN PRN
Status: DISCONTINUED | OUTPATIENT
Start: 2019-08-12 | End: 2019-08-12 | Stop reason: HOSPADM

## 2019-08-12 RX ORDER — DIPHENHYDRAMINE HCL 25 MG
25 CAPSULE ORAL EVERY 6 HOURS PRN
Status: DISCONTINUED | OUTPATIENT
Start: 2019-08-12 | End: 2019-08-14 | Stop reason: HOSPADM

## 2019-08-12 RX ORDER — ONDANSETRON 2 MG/ML
INJECTION INTRAMUSCULAR; INTRAVENOUS PRN
Status: DISCONTINUED | OUTPATIENT
Start: 2019-08-12 | End: 2019-08-12

## 2019-08-12 RX ORDER — SODIUM CHLORIDE, SODIUM LACTATE, POTASSIUM CHLORIDE, CALCIUM CHLORIDE 600; 310; 30; 20 MG/100ML; MG/100ML; MG/100ML; MG/100ML
INJECTION, SOLUTION INTRAVENOUS CONTINUOUS
Status: DISCONTINUED | OUTPATIENT
Start: 2019-08-12 | End: 2019-08-12 | Stop reason: HOSPADM

## 2019-08-12 RX ORDER — GABAPENTIN 100 MG/1
300 CAPSULE ORAL 2 TIMES DAILY
Status: DISCONTINUED | OUTPATIENT
Start: 2019-08-12 | End: 2019-08-14 | Stop reason: HOSPADM

## 2019-08-12 RX ORDER — PROPOFOL 10 MG/ML
INJECTION, EMULSION INTRAVENOUS PRN
Status: DISCONTINUED | OUTPATIENT
Start: 2019-08-12 | End: 2019-08-12

## 2019-08-12 RX ORDER — PROCHLORPERAZINE 25 MG
25 SUPPOSITORY, RECTAL RECTAL EVERY 12 HOURS PRN
Status: DISCONTINUED | OUTPATIENT
Start: 2019-08-12 | End: 2019-08-14 | Stop reason: HOSPADM

## 2019-08-12 RX ORDER — SPIRONOLACTONE 100 MG/1
100 TABLET, FILM COATED ORAL AT BEDTIME
Status: DISCONTINUED | OUTPATIENT
Start: 2019-08-12 | End: 2019-08-14 | Stop reason: HOSPADM

## 2019-08-12 RX ORDER — HYDROMORPHONE HYDROCHLORIDE 1 MG/ML
.3-.5 INJECTION, SOLUTION INTRAMUSCULAR; INTRAVENOUS; SUBCUTANEOUS EVERY 5 MIN PRN
Status: DISCONTINUED | OUTPATIENT
Start: 2019-08-12 | End: 2019-08-12 | Stop reason: HOSPADM

## 2019-08-12 RX ORDER — LIDOCAINE HYDROCHLORIDE 20 MG/ML
INJECTION, SOLUTION INFILTRATION; PERINEURAL PRN
Status: DISCONTINUED | OUTPATIENT
Start: 2019-08-12 | End: 2019-08-12

## 2019-08-12 RX ORDER — SODIUM CHLORIDE 9 MG/ML
INJECTION, SOLUTION INTRAVENOUS CONTINUOUS
Status: DISCONTINUED | OUTPATIENT
Start: 2019-08-12 | End: 2019-08-13

## 2019-08-12 RX ORDER — LIDOCAINE 40 MG/G
CREAM TOPICAL
Status: DISCONTINUED | OUTPATIENT
Start: 2019-08-12 | End: 2019-08-12 | Stop reason: HOSPADM

## 2019-08-12 RX ORDER — ONDANSETRON 4 MG/1
4 TABLET, ORALLY DISINTEGRATING ORAL EVERY 30 MIN PRN
Status: DISCONTINUED | OUTPATIENT
Start: 2019-08-12 | End: 2019-08-12 | Stop reason: HOSPADM

## 2019-08-12 RX ORDER — BUPIVACAINE HYDROCHLORIDE AND EPINEPHRINE 2.5; 5 MG/ML; UG/ML
INJECTION, SOLUTION INFILTRATION; PERINEURAL PRN
Status: DISCONTINUED | OUTPATIENT
Start: 2019-08-12 | End: 2019-08-12

## 2019-08-12 RX ORDER — FENTANYL CITRATE 50 UG/ML
INJECTION, SOLUTION INTRAMUSCULAR; INTRAVENOUS PRN
Status: DISCONTINUED | OUTPATIENT
Start: 2019-08-12 | End: 2019-08-12

## 2019-08-12 RX ORDER — DEXAMETHASONE SODIUM PHOSPHATE 4 MG/ML
INJECTION, SOLUTION INTRA-ARTICULAR; INTRALESIONAL; INTRAMUSCULAR; INTRAVENOUS; SOFT TISSUE PRN
Status: DISCONTINUED | OUTPATIENT
Start: 2019-08-12 | End: 2019-08-12

## 2019-08-12 RX ORDER — IPRATROPIUM BROMIDE AND ALBUTEROL SULFATE 2.5; .5 MG/3ML; MG/3ML
3 SOLUTION RESPIRATORY (INHALATION) 4 TIMES DAILY
Status: DISCONTINUED | OUTPATIENT
Start: 2019-08-12 | End: 2019-08-14 | Stop reason: HOSPADM

## 2019-08-12 RX ORDER — CELECOXIB 200 MG/1
200 CAPSULE ORAL ONCE
Status: COMPLETED | OUTPATIENT
Start: 2019-08-12 | End: 2019-08-12

## 2019-08-12 RX ORDER — ONDANSETRON 2 MG/ML
4 INJECTION INTRAMUSCULAR; INTRAVENOUS EVERY 6 HOURS PRN
Status: DISCONTINUED | OUTPATIENT
Start: 2019-08-12 | End: 2019-08-14 | Stop reason: HOSPADM

## 2019-08-12 RX ORDER — KETOROLAC TROMETHAMINE 30 MG/ML
15 INJECTION, SOLUTION INTRAMUSCULAR; INTRAVENOUS EVERY 6 HOURS
Status: COMPLETED | OUTPATIENT
Start: 2019-08-12 | End: 2019-08-13

## 2019-08-12 RX ORDER — ACETAMINOPHEN 325 MG/1
975 TABLET ORAL EVERY 8 HOURS
Status: DISCONTINUED | OUTPATIENT
Start: 2019-08-12 | End: 2019-08-14 | Stop reason: HOSPADM

## 2019-08-12 RX ORDER — ONDANSETRON 4 MG/1
4 TABLET, ORALLY DISINTEGRATING ORAL EVERY 6 HOURS PRN
Status: DISCONTINUED | OUTPATIENT
Start: 2019-08-12 | End: 2019-08-14 | Stop reason: HOSPADM

## 2019-08-12 RX ORDER — ACETAMINOPHEN 325 MG/1
975 TABLET ORAL ONCE
Status: COMPLETED | OUTPATIENT
Start: 2019-08-12 | End: 2019-08-12

## 2019-08-12 RX ORDER — AMOXICILLIN 250 MG
2 CAPSULE ORAL 2 TIMES DAILY
Status: DISCONTINUED | OUTPATIENT
Start: 2019-08-12 | End: 2019-08-14 | Stop reason: HOSPADM

## 2019-08-12 RX ORDER — AMOXICILLIN 250 MG
1 CAPSULE ORAL 2 TIMES DAILY
Status: DISCONTINUED | OUTPATIENT
Start: 2019-08-12 | End: 2019-08-12

## 2019-08-12 RX ORDER — NALOXONE HYDROCHLORIDE 0.4 MG/ML
.1-.4 INJECTION, SOLUTION INTRAMUSCULAR; INTRAVENOUS; SUBCUTANEOUS
Status: DISCONTINUED | OUTPATIENT
Start: 2019-08-12 | End: 2019-08-12 | Stop reason: HOSPADM

## 2019-08-12 RX ORDER — NALOXONE HYDROCHLORIDE 0.4 MG/ML
.1-.4 INJECTION, SOLUTION INTRAMUSCULAR; INTRAVENOUS; SUBCUTANEOUS
Status: DISCONTINUED | OUTPATIENT
Start: 2019-08-12 | End: 2019-08-14 | Stop reason: HOSPADM

## 2019-08-12 RX ORDER — AMOXICILLIN 250 MG
1 CAPSULE ORAL 2 TIMES DAILY
Status: DISCONTINUED | OUTPATIENT
Start: 2019-08-12 | End: 2019-08-13

## 2019-08-12 RX ADMIN — ONDANSETRON 4 MG: 2 INJECTION INTRAMUSCULAR; INTRAVENOUS at 16:58

## 2019-08-12 RX ADMIN — CELECOXIB 200 MG: 200 CAPSULE ORAL at 10:23

## 2019-08-12 RX ADMIN — HYDROMORPHONE HYDROCHLORIDE 0.5 MG: 1 INJECTION, SOLUTION INTRAMUSCULAR; INTRAVENOUS; SUBCUTANEOUS at 17:54

## 2019-08-12 RX ADMIN — ACETAMINOPHEN 975 MG: 325 TABLET, FILM COATED ORAL at 10:23

## 2019-08-12 RX ADMIN — PHENYLEPHRINE HYDROCHLORIDE 100 MCG: 10 INJECTION INTRAVENOUS at 17:00

## 2019-08-12 RX ADMIN — PROPOFOL 100 MG: 10 INJECTION, EMULSION INTRAVENOUS at 14:53

## 2019-08-12 RX ADMIN — FENTANYL CITRATE 50 MCG: 50 INJECTION INTRAMUSCULAR; INTRAVENOUS at 17:47

## 2019-08-12 RX ADMIN — Medication: at 18:30

## 2019-08-12 RX ADMIN — SODIUM CHLORIDE, POTASSIUM CHLORIDE, SODIUM LACTATE AND CALCIUM CHLORIDE: 600; 310; 30; 20 INJECTION, SOLUTION INTRAVENOUS at 14:40

## 2019-08-12 RX ADMIN — CLINDAMYCIN PHOSPHATE 900 MG: 18 INJECTION, SOLUTION INTRAVENOUS at 16:04

## 2019-08-12 RX ADMIN — FENTANYL CITRATE 250 MCG: 50 INJECTION, SOLUTION INTRAMUSCULAR; INTRAVENOUS at 14:53

## 2019-08-12 RX ADMIN — LIDOCAINE HYDROCHLORIDE 100 MG: 20 INJECTION, SOLUTION INFILTRATION; PERINEURAL at 14:53

## 2019-08-12 RX ADMIN — BUPIVACAINE HYDROCHLORIDE AND EPINEPHRINE BITARTRATE 30 ML: 2.5; .005 INJECTION, SOLUTION INFILTRATION; PERINEURAL at 12:00

## 2019-08-12 RX ADMIN — FENTANYL CITRATE 50 MCG: 50 INJECTION INTRAMUSCULAR; INTRAVENOUS at 17:34

## 2019-08-12 RX ADMIN — SPIRONOLACTONE 100 MG: 100 TABLET ORAL at 21:09

## 2019-08-12 RX ADMIN — MIDAZOLAM 2 MG: 1 INJECTION INTRAMUSCULAR; INTRAVENOUS at 14:40

## 2019-08-12 RX ADMIN — SODIUM CHLORIDE: 9 INJECTION, SOLUTION INTRAVENOUS at 22:19

## 2019-08-12 RX ADMIN — HYDROMORPHONE HYDROCHLORIDE 0.5 MG: 1 INJECTION, SOLUTION INTRAMUSCULAR; INTRAVENOUS; SUBCUTANEOUS at 18:06

## 2019-08-12 RX ADMIN — ROCURONIUM BROMIDE 20 MG: 10 INJECTION INTRAVENOUS at 16:03

## 2019-08-12 RX ADMIN — KETOROLAC TROMETHAMINE 15 MG: 30 INJECTION, SOLUTION INTRAMUSCULAR at 21:08

## 2019-08-12 RX ADMIN — ROCURONIUM BROMIDE 50 MG: 10 INJECTION INTRAVENOUS at 14:53

## 2019-08-12 RX ADMIN — CHLORHEXIDINE GLUCONATE 0.12% ORAL RINSE 15 ML: 1.2 LIQUID ORAL at 10:23

## 2019-08-12 RX ADMIN — SODIUM CHLORIDE, POTASSIUM CHLORIDE, SODIUM LACTATE AND CALCIUM CHLORIDE: 600; 310; 30; 20 INJECTION, SOLUTION INTRAVENOUS at 18:01

## 2019-08-12 RX ADMIN — Medication: at 17:36

## 2019-08-12 RX ADMIN — MIDAZOLAM 2 MG: 1 INJECTION INTRAMUSCULAR; INTRAVENOUS at 11:46

## 2019-08-12 RX ADMIN — DEXAMETHASONE SODIUM PHOSPHATE 4 MG: 4 INJECTION, SOLUTION INTRA-ARTICULAR; INTRALESIONAL; INTRAMUSCULAR; INTRAVENOUS; SOFT TISSUE at 16:17

## 2019-08-12 RX ADMIN — LORATADINE 10 MG: 10 TABLET ORAL at 21:09

## 2019-08-12 RX ADMIN — FENTANYL CITRATE 50 MCG: 50 INJECTION INTRAMUSCULAR; INTRAVENOUS at 18:19

## 2019-08-12 RX ADMIN — IPRATROPIUM BROMIDE AND ALBUTEROL SULFATE 3 ML: .5; 3 SOLUTION RESPIRATORY (INHALATION) at 21:04

## 2019-08-12 RX ADMIN — SENNOSIDES AND DOCUSATE SODIUM 2 TABLET: 8.6; 5 TABLET ORAL at 21:09

## 2019-08-12 RX ADMIN — ONDANSETRON 4 MG: 2 INJECTION INTRAMUSCULAR; INTRAVENOUS at 19:00

## 2019-08-12 RX ADMIN — GABAPENTIN 300 MG: 100 CAPSULE ORAL at 21:09

## 2019-08-12 RX ADMIN — GABAPENTIN 300 MG: 300 CAPSULE ORAL at 10:23

## 2019-08-12 RX ADMIN — ACETAMINOPHEN 975 MG: 325 TABLET, FILM COATED ORAL at 21:09

## 2019-08-12 RX ADMIN — SUGAMMADEX 120 MG: 100 INJECTION, SOLUTION INTRAVENOUS at 17:03

## 2019-08-12 RX ADMIN — FENTANYL CITRATE 50 MCG: 50 INJECTION INTRAMUSCULAR; INTRAVENOUS at 11:46

## 2019-08-12 ASSESSMENT — PAIN DESCRIPTION - DESCRIPTORS
DESCRIPTORS: ACHING
DESCRIPTORS: ACHING;STABBING
DESCRIPTORS: ACHING
DESCRIPTORS: ACHING;STABBING
DESCRIPTORS: ACHING
DESCRIPTORS: ACHING;STABBING
DESCRIPTORS: ACHING
DESCRIPTORS: ACHING;STABBING
DESCRIPTORS: ACHING
DESCRIPTORS: ACHING;STABBING
DESCRIPTORS: ACHING;STABBING

## 2019-08-12 ASSESSMENT — MIFFLIN-ST. JEOR: SCORE: 1309.13

## 2019-08-12 NOTE — BRIEF OP NOTE
[facility] [place]    Brief Operative Note    Pre-operative diagnosis: Left Lung Tumor  Post-operative diagnosis same  Procedure: Procedure(s):  Left Video Assisted Thoracoscopic Resection Superior Sulcus Tumor  Surgeon: Surgeon(s) and Role:     * Delmar Ramirez MD - Primary  Anesthesia: Combined General with Block   Estimated blood loss: Minimal  Drains: Left chest tube  Specimens:   ID Type Source Tests Collected by Time Destination   A : Left Atypical Chest wall Tumor Tissue Chest SURGICAL PATHOLOGY EXAM Delmar Ramirez MD 8/12/2019  4:51 PM      Findings:   Schwannoma.  Complications: None.  Implants:  * No implants in log *

## 2019-08-12 NOTE — ANESTHESIA POSTPROCEDURE EVALUATION
Anesthesia POST Procedure Evaluation    Patient: Keri Ornelas   MRN:     [id number] Gender:   female   Age:    33 year old :      1985        Preoperative Diagnosis: Left Lung Tumor   Procedure(s):  Left Video Assisted Thoracoscopic Resection Superior Sulcus Tumor   Postop Comments: No value filed.       Anesthesia Type:  Not documented  General    Reportable Event: NO     PAIN: Uncomplicated   Sign Out status: Comfortable, Well controlled pain     PONV: No PONV   Sign Out status:  No Nausea or Vomiting     Neuro/Psych: Uneventful perioperative course   Sign Out Status: Preoperative baseline; Age appropriate mentation     Airway/Resp.: Uneventful perioperative course   Sign Out Status: Non labored breathing, age appropriate RR; Resp. Status within EXPECTED Parameters     CV: Uneventful perioperative course   Sign Out status: Appropriate BP and perfusion indices; Appropriate HR/Rhythm     Disposition:   Sign Out in:  PACU  Disposition:  Floor  Recovery Course: Uneventful  Follow-Up: Not required           Last Anesthesia Record Vitals:  CRNA VITALS  2019 1656 - 2019 1729      2019             Resp Rate (observed):  3  (Abnormal)           Last PACU Vitals:  Vitals Value Taken Time   /78 2019  5:23 PM   Temp     Pulse 100 2019  5:23 PM   Resp     SpO2 100 % 2019  5:27 PM   Temp src     NIBP 108/76 2019  5:25 PM   Pulse     SpO2 100 % 2019  5:25 PM   Resp     Temp     Ht Rate 96 2019  5:25 PM   Temp 2     Vitals shown include unvalidated device data.      Electronically Signed By: Yu Peters MD, 2019, 5:29 PM

## 2019-08-12 NOTE — ANESTHESIA CARE TRANSFER NOTE
Patient: Keri Ornelas    Procedure(s):  Left Video Assisted Thoracoscopic Resection Superior Sulcus Tumor    Diagnosis: Left Lung Tumor  Diagnosis Additional Information: No value filed.    Anesthesia Type:   General     Note:  Airway :Face Mask  Patient transferred to:PACU  Handoff Report: Identifed the Patient, Identified the Reponsible Provider, Reviewed the pertinent medical history, Discussed the surgical course, Reviewed Intra-OP anesthesia mangement and issues during anesthesia, Set expectations for post-procedure period and Allowed opportunity for questions and acknowledgement of understanding      Vitals: (Last set prior to Anesthesia Care Transfer)    CRNA VITALS  8/12/2019 1656 - 8/12/2019 1726      8/12/2019             Resp Rate (observed):  3  (Abnormal)                 Electronically Signed By: BECK Gray CRNA  August 12, 2019  5:26 PM

## 2019-08-12 NOTE — ANESTHESIA PROCEDURE NOTES
Peripheral Nerve Block Procedure Note    Staff:     Anesthesiologist:  Daren Miller DO    Resident/CRNA:  Brody Chavira MD    Block performed by resident/CRNA in the presence of a teaching physician    Location: Pre-op  Procedure Start/Stop TImes:     patient identified, IV checked, site marked, risks and benefits discussed, informed consent, monitors and equipment checked, pre-op evaluation, at physician/surgeon's request and post-op pain management      Correct Patient: Yes      Correct Position: Yes      Correct Site: Yes      Correct Procedure: Yes      Correct Laterality:  Yes    Site Marked:  Yes  Procedure details:     Procedure:  Other (Erector spinae)    Laterality:  Left    Position:  Right Lateral Decubitus    Sterile Prep: chloraprep, mask and sterile gloves      Insertion Site:  T6-7    Needle:  Touhy needle    Needle gauge:  17    Needle length (inches):  3.1    Catheter gauge:  20    Catheter threaded easily: Yes      Threaded to cm at skin:  11    Ultrasound: Yes      Ultrasound used to identify targeted nerve, plexus, or vascular structure and placed a needle adjacent to it      Permanent Image entered into patiient's record      Abnormal pain on injection: No      Blood Aspirated: No      Paresthesias:  No    Bleeding at site: No      Bolus via:  Needle    Infusion Method:  Continuous Infusion    Secured:  Dermabond and Tegaderm    Complications:  None  Assessment/Narrative:     Injection made incrementally with aspirations every (mL):  5

## 2019-08-13 ENCOUNTER — APPOINTMENT (OUTPATIENT)
Dept: GENERAL RADIOLOGY | Facility: CLINIC | Age: 34
DRG: 497 | End: 2019-08-13
Attending: THORACIC SURGERY (CARDIOTHORACIC VASCULAR SURGERY)
Payer: COMMERCIAL

## 2019-08-13 ENCOUNTER — APPOINTMENT (OUTPATIENT)
Dept: OCCUPATIONAL THERAPY | Facility: CLINIC | Age: 34
DRG: 497 | End: 2019-08-13
Attending: THORACIC SURGERY (CARDIOTHORACIC VASCULAR SURGERY)
Payer: COMMERCIAL

## 2019-08-13 LAB
ANION GAP SERPL CALCULATED.3IONS-SCNC: 7 MMOL/L (ref 3–14)
BUN SERPL-MCNC: 16 MG/DL (ref 7–30)
CALCIUM SERPL-MCNC: 8.9 MG/DL (ref 8.5–10.1)
CHLORIDE SERPL-SCNC: 107 MMOL/L (ref 94–109)
CO2 SERPL-SCNC: 25 MMOL/L (ref 20–32)
CREAT SERPL-MCNC: 0.65 MG/DL (ref 0.52–1.04)
ERYTHROCYTE [DISTWIDTH] IN BLOOD BY AUTOMATED COUNT: 12 % (ref 10–15)
GFR SERPL CREATININE-BSD FRML MDRD: >90 ML/MIN/{1.73_M2}
GLUCOSE SERPL-MCNC: 113 MG/DL (ref 70–99)
HCT VFR BLD AUTO: 39.3 % (ref 35–47)
HGB BLD-MCNC: 13 G/DL (ref 11.7–15.7)
MAGNESIUM SERPL-MCNC: 1.7 MG/DL (ref 1.6–2.3)
MCH RBC QN AUTO: 30.6 PG (ref 26.5–33)
MCHC RBC AUTO-ENTMCNC: 33.1 G/DL (ref 31.5–36.5)
MCV RBC AUTO: 93 FL (ref 78–100)
PLATELET # BLD AUTO: 233 10E9/L (ref 150–450)
POTASSIUM SERPL-SCNC: 3.8 MMOL/L (ref 3.4–5.3)
RADIOLOGIST FLAGS: ABNORMAL
RBC # BLD AUTO: 4.25 10E12/L (ref 3.8–5.2)
SODIUM SERPL-SCNC: 139 MMOL/L (ref 133–144)
WBC # BLD AUTO: 17.8 10E9/L (ref 4–11)

## 2019-08-13 PROCEDURE — 40000275 ZZH STATISTIC RCP TIME EA 10 MIN: Performed by: OPTOMETRIST

## 2019-08-13 PROCEDURE — 25000132 ZZH RX MED GY IP 250 OP 250 PS 637: Performed by: SURGERY

## 2019-08-13 PROCEDURE — 97535 SELF CARE MNGMENT TRAINING: CPT | Mod: GO

## 2019-08-13 PROCEDURE — 80048 BASIC METABOLIC PNL TOTAL CA: CPT | Performed by: SURGERY

## 2019-08-13 PROCEDURE — 83735 ASSAY OF MAGNESIUM: CPT | Performed by: SURGERY

## 2019-08-13 PROCEDURE — 71046 X-RAY EXAM CHEST 2 VIEWS: CPT

## 2019-08-13 PROCEDURE — 94640 AIRWAY INHALATION TREATMENT: CPT | Mod: 76 | Performed by: OPTOMETRIST

## 2019-08-13 PROCEDURE — 97165 OT EVAL LOW COMPLEX 30 MIN: CPT | Mod: GO

## 2019-08-13 PROCEDURE — 94640 AIRWAY INHALATION TREATMENT: CPT

## 2019-08-13 PROCEDURE — 94640 AIRWAY INHALATION TREATMENT: CPT | Mod: 76

## 2019-08-13 PROCEDURE — 36415 COLL VENOUS BLD VENIPUNCTURE: CPT | Performed by: SURGERY

## 2019-08-13 PROCEDURE — 12000001 ZZH R&B MED SURG/OB UMMC

## 2019-08-13 PROCEDURE — 25000128 H RX IP 250 OP 636: Performed by: NURSE PRACTITIONER

## 2019-08-13 PROCEDURE — 40000275 ZZH STATISTIC RCP TIME EA 10 MIN

## 2019-08-13 PROCEDURE — 25000125 ZZHC RX 250: Performed by: SURGERY

## 2019-08-13 PROCEDURE — 97110 THERAPEUTIC EXERCISES: CPT | Mod: GO

## 2019-08-13 PROCEDURE — 71046 X-RAY EXAM CHEST 2 VIEWS: CPT | Mod: 76

## 2019-08-13 PROCEDURE — 85027 COMPLETE CBC AUTOMATED: CPT | Performed by: SURGERY

## 2019-08-13 PROCEDURE — 25000128 H RX IP 250 OP 636: Performed by: SURGERY

## 2019-08-13 RX ORDER — CYCLOBENZAPRINE HCL 10 MG
10 TABLET ORAL 3 TIMES DAILY PRN
Status: DISCONTINUED | OUTPATIENT
Start: 2019-08-13 | End: 2019-08-14 | Stop reason: HOSPADM

## 2019-08-13 RX ORDER — BUPIVACAINE HYDROCHLORIDE 5 MG/ML
3 INJECTION, SOLUTION EPIDURAL; INTRACAUDAL ONCE
Status: COMPLETED | OUTPATIENT
Start: 2019-08-13 | End: 2019-08-13

## 2019-08-13 RX ORDER — CYCLOBENZAPRINE HCL 10 MG
10 TABLET ORAL 3 TIMES DAILY
Status: DISCONTINUED | OUTPATIENT
Start: 2019-08-13 | End: 2019-08-13

## 2019-08-13 RX ORDER — HYDROMORPHONE HCL/0.9% NACL/PF 0.2MG/0.2
0.2 SYRINGE (ML) INTRAVENOUS
Status: DISCONTINUED | OUTPATIENT
Start: 2019-08-13 | End: 2019-08-14 | Stop reason: HOSPADM

## 2019-08-13 RX ADMIN — BUPIVACAINE HYDROCHLORIDE 15 MG: 5 INJECTION, SOLUTION EPIDURAL; INTRACAUDAL; PERINEURAL at 11:29

## 2019-08-13 RX ADMIN — LIDOCAINE 1 PATCH: 560 PATCH PERCUTANEOUS; TOPICAL; TRANSDERMAL at 08:53

## 2019-08-13 RX ADMIN — ACETAMINOPHEN 975 MG: 325 TABLET, FILM COATED ORAL at 21:12

## 2019-08-13 RX ADMIN — GABAPENTIN 300 MG: 100 CAPSULE ORAL at 19:21

## 2019-08-13 RX ADMIN — KETOROLAC TROMETHAMINE 15 MG: 30 INJECTION, SOLUTION INTRAMUSCULAR at 16:30

## 2019-08-13 RX ADMIN — KETOROLAC TROMETHAMINE 15 MG: 30 INJECTION, SOLUTION INTRAMUSCULAR at 03:10

## 2019-08-13 RX ADMIN — SENNOSIDES AND DOCUSATE SODIUM 2 TABLET: 8.6; 5 TABLET ORAL at 19:22

## 2019-08-13 RX ADMIN — IPRATROPIUM BROMIDE AND ALBUTEROL SULFATE 3 ML: .5; 3 SOLUTION RESPIRATORY (INHALATION) at 15:54

## 2019-08-13 RX ADMIN — SPIRONOLACTONE 100 MG: 100 TABLET ORAL at 21:12

## 2019-08-13 RX ADMIN — ONDANSETRON 4 MG: 2 INJECTION INTRAMUSCULAR; INTRAVENOUS at 08:52

## 2019-08-13 RX ADMIN — CYCLOBENZAPRINE 10 MG: 10 TABLET, FILM COATED ORAL at 16:31

## 2019-08-13 RX ADMIN — KETOROLAC TROMETHAMINE 15 MG: 30 INJECTION, SOLUTION INTRAMUSCULAR at 08:52

## 2019-08-13 RX ADMIN — IPRATROPIUM BROMIDE AND ALBUTEROL SULFATE 3 ML: .5; 3 SOLUTION RESPIRATORY (INHALATION) at 19:38

## 2019-08-13 RX ADMIN — GABAPENTIN 300 MG: 100 CAPSULE ORAL at 08:52

## 2019-08-13 RX ADMIN — LORATADINE 10 MG: 10 TABLET ORAL at 08:53

## 2019-08-13 RX ADMIN — ACETAMINOPHEN 975 MG: 325 TABLET, FILM COATED ORAL at 04:58

## 2019-08-13 RX ADMIN — IPRATROPIUM BROMIDE AND ALBUTEROL SULFATE 3 ML: .5; 3 SOLUTION RESPIRATORY (INHALATION) at 11:58

## 2019-08-13 RX ADMIN — ENOXAPARIN SODIUM 40 MG: 40 INJECTION SUBCUTANEOUS at 12:19

## 2019-08-13 RX ADMIN — ACETAMINOPHEN 975 MG: 325 TABLET, FILM COATED ORAL at 12:18

## 2019-08-13 RX ADMIN — IPRATROPIUM BROMIDE AND ALBUTEROL SULFATE 3 ML: .5; 3 SOLUTION RESPIRATORY (INHALATION) at 08:07

## 2019-08-13 RX ADMIN — SENNOSIDES AND DOCUSATE SODIUM 2 TABLET: 8.6; 5 TABLET ORAL at 08:52

## 2019-08-13 RX ADMIN — ONDANSETRON 4 MG: 2 INJECTION INTRAMUSCULAR; INTRAVENOUS at 00:13

## 2019-08-13 ASSESSMENT — ACTIVITIES OF DAILY LIVING (ADL)
ADLS_ACUITY_SCORE: 13
ADLS_ACUITY_SCORE: 15
ADLS_ACUITY_SCORE: 14
ADLS_ACUITY_SCORE: 15
ADLS_ACUITY_SCORE: 13
ADLS_ACUITY_SCORE: 15
PREVIOUS_RESPONSIBILITIES: MEAL PREP;HOUSEKEEPING;LAUNDRY;SHOPPING;YARDWORK;FINANCES;DRIVING;WORK

## 2019-08-13 ASSESSMENT — MIFFLIN-ST. JEOR: SCORE: 1316.44

## 2019-08-13 NOTE — PROGRESS NOTES
08/13/19 0930   Quick Adds   Type of Visit Initial Occupational Therapy Evaluation   Living Environment   Lives With friend(s)  (roomate)   Living Arrangements house   Home Accessibility stairs to enter home;stairs within home   Number of Stairs, Main Entrance 2   Number of Stairs, Within Home, Primary 10   Transportation Anticipated car, drives self;family or friend will provide   Living Environment Comment Pt lives in a split level home with bedroom, bathroom with tub-shower, and kitchen all on upper level.    Self-Care   Usual Activity Tolerance excellent   Current Activity Tolerance fair   Regular Exercise Yes   Activity/Exercise Type swimming;other (see comments);walking  (paddle boarding, walking dogs)   Exercise Amount/Frequency daily   Equipment Currently Used at Home none   Activity/Exercise/Self-Care Comment IND in all self-cares at baseline   Functional Level   Ambulation 0-->independent   Transferring 0-->independent   Toileting 0-->independent   Bathing 0-->independent   Dressing 0-->independent   Eating 0-->independent   Communication 0-->understands/communicates without difficulty   Swallowing 0-->swallows foods/liquids without difficulty   Cognition 0 - no cognition issues reported   Fall history within last six months no   Which of the above functional risks had a recent onset or change? 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 Bolivar Sim on 8/12/19 at [facility].    Precautions/Limitations fall precautions;abdominal precautions   General Observations Pt sleepying soundly upon arrival, agreeable to  therapy. Nausea noted.   General Info Comments Ambulate with assist 3x daily   Cognitive Status Examination   Orientation orientation to person, place and time   Level of Consciousness alert;lethargic/somnolent   Follows Commands (Cognition) WNL   Memory intact   Attention No deficits were identified   Organization/Problem Solving No deficits were identified   Executive Function No deficits were identified   Visual Perception   Visual Perception No deficits were identified   Sensory Examination   Sensory Quick Adds No deficits were identified   Pain Assessment   Patient Currently in Pain Yes, see Vital Sign flowsheet   Integumentary/Edema   Integumentary/Edema no deficits were identifed   Posture   Posture protracted shoulders   Range of Motion (ROM)   ROM Comment L UE ROM limited 2/2 pain   Strength   Manual Muscle Testing Quick Adds No deficits were identified   Hand Strength   Hand Strength Comments Strength grasp WNL   Muscle Tone Assessment   Muscle Tone Quick Adds No deficits were identified   Coordination   Upper Extremity Coordination No deficits were identified   Instrumental Activities of Daily Living (IADL)   Previous Responsibilities meal prep;housekeeping;laundry;shopping;yardwork;finances;driving;work   IADL Comments IND in all IADL's at baseline   Activities of Daily Living Analysis   Impairments Contributing to Impaired Activities of Daily Living flexibility decreased;pain;post surgical precautions;postural control impaired;ROM decreased;strength decreased   General Therapy Interventions   Planned Therapy Interventions ADL retraining;ROM;strengthening;transfer training;progressive activity/exercise;home program guidelines   Clinical Impression   Criteria for Skilled Therapeutic Interventions Met yes, treatment  "indicated   OT Diagnosis Post op thoracic surgery   Influenced by the following impairments Activity tolerance, post-surgical precautions, pain, nausea   Assessment of Occupational Performance 3-5 Performance Deficits   Identified Performance Deficits dressing, bathing, functional mobility, home managment   Clinical Decision Making (Complexity) Low complexity   Therapy Frequency Daily   Predicted Duration of Therapy Intervention (days/wks) 1 week   Anticipated Equipment Needs at Discharge shower chair   Anticipated Discharge Disposition Home with Assist   Risks and Benefits of Treatment have been explained. Yes   Patient, Family & other staff in agreement with plan of care Yes   Clinical Impression Comments Anticipate home with assist as pt progresses. Pt would benefit from further IP OT to address activity tolerance to improve ADL/IADL safety and performance.   [facility] AM-PAC  \"6 Clicks\" Daily Activity Inpatient Short Form   1. Putting on and taking off regular lower body clothing? 2 - A Lot   2. Bathing (including washing, rinsing, drying)? 2 - A Lot   3. Toileting, which includes using toilet, bedpan or urinal? 2 - A Lot   4. Putting on and taking off regular upper body clothing? 2 - A Lot   5. Taking care of personal grooming such as brushing teeth? 3 - A Little   6. Eating meals? 3 - A Little   Daily Activity Raw Score (Score out of 24.Lower scores equate to lower levels of function) 14     "

## 2019-08-13 NOTE — PLAN OF CARE
"/72 (BP Location: Left arm)   Pulse 81   Temp 98.1  F (36.7  C) (Oral)   Resp 18   Ht 1.6 m (5' 3\")   Wt 62.5 kg (137 lb 12.6 oz)   LMP 07/19/2019 (Exact Date)   SpO2 98%   BMI 24.41 kg/m    Neuros: A/O x4, calls appropriately.   Cardiac: WNL, BP stable.   Respiratory: LS diminished, denies n/v. Sating in high 90's on RA.   GI/: Voiding, not saving. +BS, passing flatus, no BM.   Diet: advanced to regular diet.   Activity: up ad hayden  Skin: L lateral chest incision derma bond, CDI.   LDA: PIV SL. On Q infusing at 14 ml/hr, site CDI.   Pain: reports pain is tolerable with scheduled toradol and on Q pump.   Lab: chest X ray completed.   New changes this shift: CT removed. Pt received on Q bolus at about 1100 this morning.   Plan: Continue to monitor and POC.     "

## 2019-08-13 NOTE — PLAN OF CARE
OT/7B: Discharge Planner OT   Patient plan for discharge: Home with roommate  Current status: Pt reported nausea throughout session limiting functional mobility, informed nursing. IND supine<>EOB. Instructed pt to complete UE HEP 3x/day within a pain-free ROM, reinforced with handout. Modified 4/4 exercises 2/2 L UE pain, instructed to grade up as pain improves. Required set-up to complete ADL's EOB 2/2 nausea. VSS on RA.  Barriers to return to prior living situation: Activity tolerance, L UE pain/decreased ROM, nausea  Recommendations for discharge: Home with assist  Rationale for recommendations: Anticipate home with assist as pt progresses. Pt would benefit from further IP OT to address activity tolerance to improve ADL/IADL safety and performance.       Entered by: Shikha Colon 08/13/2019 8:49 AM

## 2019-08-13 NOTE — PROGRESS NOTES
OT: Instructed pt to complete UE HEP 3x/day within a pain-free ROM to prevent pain and adhesions, reinforced with handout. Modified 4/4 exercises 2/2 L UE pain: Sh flexion x6 completed within decreased ROM, pain noted when arm lowering, instructed to stop. Completed R sh internal and external rotation 1min hold each, and R sh abduction with side-lean x10. L UE not completed 2/2 pain.

## 2019-08-13 NOTE — PLAN OF CARE
"Shift: 2300 - 0700  VS: /55 (BP Location: Right arm)   Pulse 81   Temp 98  F (36.7  C) (Oral)   Resp 14   Ht 1.6 m (5' 3\")   Wt 62.5 kg (137 lb 12.6 oz)   LMP 07/19/2019 (Exact Date)   SpO2 97%   BMI 24.41 kg/m      Neuro: A&Ox4, lethargic, arouses to voice or light touch  Cardiac: BPs soft, but improving and within parameters  Resp: lung sounds diminished, no SOB reported, sating well on 1-2L NC, attempted to wean O2, capno in place, IS encouraged  GI: not passing gas, bowel sounds hypoactive, no BM this shift  : voiding spontaneously, adequate amount of dago urine, pt currently menstruating  Skin: L chest incision with liquid bandage EARL, L CT site with mild crepitus on lateral R side of incision  Pain/Nausea: incisional chest pain controlled with On-Q at 14ml/hr, PCA dilaudid 0.2, scheduled tylenol and toradol; intermittent nausea and vomiting treated with zofran x1  LDA: L PIV infusing IVMG, R PIV SL; CT to water seal with no airleak noted  Diet: clears  Mobility: 1 assist, ambulated to bathroom this shift  Labs: reviewed  Plan: continue plan of care ,.  "

## 2019-08-13 NOTE — PROGRESS NOTES
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    Pt reports pain is controlled, feels better than earlier; nausea from earlier resolved.  C/o incisional pain and discomfort with deep breaths.  No new issues.        /52 (BP Location: Right arm)   Pulse 81   Temp 96.1  F (35.6  C) (Oral)   Resp 15   Ht 1.6 m (5' 3\")   Wt 62.5 kg (137 lb 12.6 oz)   LMP 07/19/2019 (Exact Date)   SpO2 97%   BMI 24.41 kg/m    General: Alert, interactive, & in NAD  Resp: normal work of breathing   Cardiac: Regular rate; extremities warm   Incision: c/d/i without erythema, warmth, or discharge. Chest tube in place on L, serosanguinous drainage in pleurevac.    Extremities: No LE edema or obvious joint abnormalities    EBL <5cc      A/P: No acute post-op issues. Continue plan of care per primary team. Please call with questions.     Mica Pleitez MD  Surgery Resident PGY-1  Pg [phone]      "

## 2019-08-13 NOTE — PROGRESS NOTES
REGIONAL ANESTHESIA PAIN SERVICE CONTINUOUS NERVE INFUSION NOTE  Keri Ornelas is a 33 year old female POD #1 s/p WEDGE RESECTION, LUNG, THORACOSCOPIC  THORACOTOMY and placement of left T6-7 erector spinae (ES) catheter for pain control.    SUBJECTIVE:  Interval History: Overnight no acute events.  Patient seen at 0900 awake, reports no pain at rest and left-sided anterior chest pain 6/10 with deep breathing. Reports pain control with nerve block continuous infusion and current analgesics (see below) and declined nerve block bolus at that time.  Denies weakness, paresthesias, circumoral numbness, metallic taste or tinnitus. Patient ambulating with stanby assistance.  Currently tolerating a regular diet, has nausea with standing, does not think it is related to PCA Dilaudid use.     Clinically Aligned Pain Assessment (CAPA):   Comfort (How is your pain?): Tolerable with discomfort  Change in Pain (Since your last medication/intervention?): About the same  Pain Control (How are your pain treatments working?):  Partially effective pain control  Functioning (Are you able to do activities to get better?) : Can do most things, but pain gets in the way of some       Antithrombotic/Thrombolytic Therapy ordered:    enoxaparin (LOVENOX) injection 40 mg 40 mg, SC, Q24H         Analgesic Medications:   Medications related to Pain Management (From now, onward)    Start     Dose/Rate Route Frequency Ordered Stop    08/13/19 0800  Lidocaine (LIDOCARE) 4 % Patch 1 patch      1 patch  over 12 Hours Transdermal EVERY 24 HOURS 0800 08/12/19 2028 08/13/19 0720  HYDROmorphone (DILAUDID) injection 0.2 mg      0.2 mg Intravenous EVERY 2 HOURS PRN 08/13/19 0720 08/13/19 0719  cyclobenzaprine (FLEXERIL) tablet 10 mg      10 mg Oral 3 TIMES DAILY PRN 08/13/19 0719 08/12/19 2030  acetaminophen (TYLENOL) tablet 975 mg      975 mg Oral EVERY 8 HOURS 08/12/19 2028 08/15/19 2029    08/12/19 2030  lidocaine patch in PLACE        Transdermal EVERY 8 HOURS 08/12/19 2028 08/12/19 2030  senna-docusate (SENOKOT-S/PERICOLACE) 8.6-50 MG per tablet 2 tablet      2 tablet Oral 2 TIMES DAILY 08/12/19 2028 08/12/19 2027  diphenhydrAMINE (BENADRYL) capsule 25 mg      25 mg Oral EVERY 6 HOURS PRN 08/12/19 2028 08/12/19 2027  diphenhydrAMINE (BENADRYL) injection 25 mg      25 mg Intravenous EVERY 6 HOURS PRN 08/12/19 2028 08/12/19 2027  oxyCODONE (ROXICODONE) tablet 5-10 mg      5-10 mg Oral EVERY 3 HOURS PRN 08/12/19 2028 08/12/19 2000  gabapentin (NEURONTIN) capsule 300 mg      300 mg Oral 2 TIMES DAILY 08/12/19 1738 08/12/19 1745  ketorolac (TORADOL) injection 15 mg     Note to Pharmacy:  IF celecoxib was given pre-operatively, start ketorolac 12 hours after celecoxib given.    15 mg Intravenous EVERY 6 HOURS 08/12/19 1743 08/13/19 2059 08/12/19 1215  ropivacaine 0.2% (NAROPIN) 750 mL in ON-Q C-Bloc select flow ([id number] holds 600-750 mL) single cath disposable pump      14 mL/hr  Irrigation Continuous Nerve Block 08/12/19 1207             OBJECTIVE:  Lab results  Recent Labs   Lab Test 08/13/19  0801   WBC 17.8*   RBC 4.25   HGB 13.0   HCT 39.3   MCV 93   MCH 30.6   MCHC 33.1   RDW 12.0          No results found for: INR      Vitals:    Temp:  [96.1  F (35.6  C)-98  F (36.7  C)] 97.7  F (36.5  C)  Pulse:  [] 81  Heart Rate:  [67-91] 79  Resp:  [6-22] 14  BP: ()/(51-99) 113/59  SpO2:  [96 %-100 %] 99 %    Exam:   GEN: alert, no distress at rest and mild distress movement and deep breathing  NEURO/MSK: Extent of sensory blockade: Tested with alcohol swab: decreased sensation to cold L) T3-T6,  Strength BLE 5/5  and overall symmetric  SKIN: left erector spinae (ES) catheter site with dressing c/d/i, no tenderness, erythema, heme, edema      ASSESSMENT  Keri LORETA Ornelas is a 33 year old female POD #1 s/p WEDGE RESECTION, LUNG, THORACOSCOPIC  THORACOTOMY and placement of left T6-7 erector spinae (ES) catheter  for pain control.    Patient is receiving modest analgesia with current multimodal therapy including left erector spinae (ES) catheter with ropivacaine 0.2% at 14mL/hr.  Motor function intact and adequate sensory block, patient is meeting activity goals.  No evidence of adverse side effects related to local anesthetic.     PLAN  - continue ropivacaine 0.2% infusion rate at 14 mL/hr  - discontinue lidocaine patch order.  Patient can have local anesthetic bolus Q 12 hr PRN for pain related to incision and CT  - expected change of next On-Q pump is tomorrow  - antithrombotic/thrombolytic therapy okay to continue Enoxaparin (Lovenox) SQ as ordered. Please contact RAPS (#[phone]) prior to any medication changes    1130 Cinician administered nerve block bolus  - Current vital signs: /59, P 77, MAP 81  - MEDICATION: PF bupivacaine 0.25%, Total 6mL via nerve block catheter  - PROCEDURE: Clinician bolus administered without complication, negative aspirate before and between each mL.    No symptoms of local anesthetic systemic toxicity (LAST).   Remained with and assessed patient for 10 min post-injection. BP, P, and MAP stable  - POST-PROCEDURE: Bedside RN aware of need to continue to monitoring and document BP, P, and MAP Q 10 min for an additional 20 min. Contact RAPS (jobcode ID [id number]) if any of the following: patient experiencing any untoward effects, SBP < 90, P < 50 or > 120, MAP < 60     1200 Follow up: Patient reports pain unchanged at rest and 5-6/10 with activity.        - patient can be evaluated to receive local anesthetic bolus Q 12 hr PRN pain not controlled with continuous infusion.  Bedside RN must page RAPS to request bolus  - will continue to follow and adjust as needed        - discussed plan with attending anesthesiologist    BECK [facility]  Regional Anesthesia Pain Service  8/13/2019 11:07 AM    RAPS Contact Info (24 hour job code pager is the last 4 digits) For in-house use only:    [place] phone: [place] [phone], [place] [phone], [facility] [phone], then enter call-back number.    Text: Use [employer] on the Intranet <Paging/Directory> tab and enter Jobcode ID.   If no call back at any time, contact the hospital  and ask for RAPS attending or backup

## 2019-08-13 NOTE — OP NOTE
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      PROCEDURES PERFORMED:     1.  Left video-assisted thoracic surgery resection of upper posterior sulcus chest wall tumor.   2.  Flexible bronchoscopy.      ANESTHESIA:  General endotracheal anesthesia with a double-lumen endotracheal tube.      SURGEON:  Dlemar Sim MD       ASSISTANT:  None.      COMPLICATIONS:  None.      ESTIMATED BLOOD LOSS:  Less than 5 mL.      DRAINS:  A 28-Vietnamese straight Bristow tube directed posterior apical.      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  The specimen was marked in the following manner, short stitch superior, long stitch lateral, double stitch deep.      OPERATIVE FINDINGS:   1.  Flexible bronchoscopy:  The patient had thin secretions.  No endobronchial lesions or external compression.   2.  Thoracoscopy:  The patient had a 4 x 3 cm chest wall tumor located in the posterior sulcus at the level of T2.  This was just lateral to the sympathetic chain.  The tumor was resected en bloc.  The sympathetic chain was preserved since it was not involved.      DESCRIPTION OF PROCEDURE IN DETAIL:  The patient was taken to the operating room, placed in a supine position.  All pressure points were adequately padded.  General endotracheal anesthesia was induced.  We placed a double-lumen endotracheal tube for single lung ventilation.  The patient was then turned into a right lateral decubitus position with the left side up.  The left chest was prepped with ChloraPrep and draped in normal sterile fashion.  A formal timeout was carried out confirming the name of the patient and correct procedure.  She had SCDs in place and functioning prior to induction of anesthesia.  She received antibiotics within 30 minutes of incision.      After the timeout was completed, we started by making a  12 mm incision in the 8th intercostal space posterior axillary line.  We entered the chest with an open technique.  We then made a 3 cm utility incision in the fourth intercostal space mid axillary line.  We preserved the serratus muscle splitting the fibers.  The intercostal muscles were then divided.  We placed an Marin wound protector.  Initial inspection revealed no evidence of pleural effusion or mediastinal adenopathy, no pleural implants.  We clearly identified the chest wall tumor located at the level of T2 just lateral to the sympathetic chain.  The tumor appeared to have a soft consistency.  We used electrocautery to score the parietal pleura at least 2 cm from the edge of the tumor.  We used this demarcation to excise the tumor en bloc using LigaSure device.  The medial extension of the tumor was just lateral to the sympathetic chain.  Therefore, this was preserved.  The deepest margin of the tumor was shaved off of the second rib.  Care was taken to resect the tumor en bloc with a surrounding intercostal muscle.  Once the tumor was excised, it was placed in an Endo Catch bag and retrieved.  The tumor was marked in the following fashion, short stitch superior, long stitch lateral, double stitch deep.  Specimen was sent to pathology for diagnosis and permanent.  The surgical field was then inspected for hemostasis.  The wound was copiously irrigated with sterile water.  We placed a 28-Maltese straight Brohman tube in the left pleural space directed posterior apical.  The lung was reinflated under direct vision.  The wounds were then closed in multiple layers using absorbable suture with 3-0 Vicryl and 4-0 Vicryl for the skin.  The patient tolerated the procedure well.  She was then extubated in the operating room without any complications and transferred to PACU for recovery.  All instrument and sponge counts were correct at the end of the case.         DEJON REID MD             D: 08/12/2019    T: 2019   MT: LILIA      Name:     SHERIDAN SCHMID   MRN:      [id number]        Account:        [id number]   :      1985           Procedure Date: 2019      Document: [id number]

## 2019-08-13 NOTE — PROGRESS NOTES
"REGIONAL ANESTHESIA PAIN SERVICE (RAPS) EVALUATION:  - Time: 6.30 pm  Called to evaluate patient for pain   - Evaluation: Patient reports pain intensity with current therapy 5/10 at rest and 7/10 with activity  General: healthy, alert and no distress  Catheter system integrity: Intact    Skin: dressing clean, dry and intact   .  Current vitals:Vital signs:  Temp: 36.7  C (98.1  F) Temp src: Oral BP: 109/72 Pulse: 81 Heart Rate: 81 Resp: 18 SpO2: 98 % O2 Device: None (Room air) Oxygen Delivery: 1 LPM Height: 160 cm (5' 3\") Weight: 62.5 kg (137 lb 12.6 oz)  Estimated body mass index is 24.41 kg/m  as calculated from the following:    Height as of this encounter: 1.6 m (5' 3\").    Weight as of this encounter: 62.5 kg (137 lb 12.6 oz).      - Assessment/Plan    PAIN: controlled with erector spinae catheter infusion    INTERVENTION: Bolus of 0.25% bupivacaine 6cc was given through erector spinae catheter      MEDICATION: PF bupivacaine 0.25 %, total bolus 6 mLwas given    PROCEDURE: Clinician bolus via epidural nerve block catheter; administered without complication with negative aspirate before and between each mL.    No symptoms of local anesthetic systemic toxicity (LAST). Remained with and assessed patient for 30 min post-injection. BP, P and MAP stable    POST-PROCEDURE: Bedside RN aware of need to continue BP, P and MAP monitoring Q 10 min for an additional 30 min. Contact RAPS (jobcode ID ) if any of the following: patient experiencing any untoward effects, SBP< 90, P < 50 or > 120, MAP < 60    - patient can be evaluated to receive local anesthetic bolus Q 12 hr PRN pain not controlled with continuous infusion.  Bedside nurse must page RAPS to request bolus    Kade Arthur MD      RAPS Contact Info (24 hour job code pager is the last 4 digits) For in-house use only:  [facility] phone: [place] [phone], [address] [phone], [place] [phone], then enter call-back number.    Text: Use [employer] on the Intranet " <Paging/Directory> tab and enter Jobcode ID.   If no call back at any time, contact the hospital  and ask for RAPS attending or backup

## 2019-08-13 NOTE — PLAN OF CARE
PT 7B:  PT orders received, do not anticipate pt will need skilled PT to facilitate hospital discharge.  OT to follow pt for immediate post-op mobility and discharge needs.  Pt has been up walking with SBA, limited by nausea and post-op pain.  Given age, anticipate she will progress functional mobility well without skilled PT.  PT will initiate services if specific needs arise.

## 2019-08-13 NOTE — PLAN OF CARE
"/52 (BP Location: Right arm)   Pulse 81   Temp 96.1  F (35.6  C) (Oral)   Resp 15   Ht 1.6 m (5' 3\")   Wt 62.5 kg (137 lb 12.6 oz)   LMP 07/19/2019 (Exact Date)   SpO2 97%   BMI 24.41 kg/m      Arrived from PACU to  at approximately 2050.     Neuro: A&Ox4, uses call light appropriately  Cardiac: pressures are soft. HR: WNL  Respiratory: sats 97% on 2L NC. Denies SOB. Denies chest pain.   GI/: hypoactive BS, not passing flatus. Due to void.   Diet: clear liquids, tolerating ok.   Skin/Incisions: Left incision by breast area;  dermabonded. No drainage.   Lines/Drains: Left chest tube to water; dressing intact. No drainage. 2 PIVs (Right PIV- saline locked. Left IV infusing MIV and PCA).   Pain/Nausea: Incisional pain currently managed with On Q pump at 14 mL/hr; PCA dilaudid 0.2 q 10 mins with scheduled toradol.  Intermittently nauseated. Emesis X1  Activity: Has not gotten out of bed this shift but is able to turned side by side in bed independently.   Plan:  Continue with plan of care   "

## 2019-08-14 ENCOUNTER — PATIENT OUTREACH (OUTPATIENT)
Dept: CARE COORDINATION | Facility: CLINIC | Age: 34
End: 2019-08-14

## 2019-08-14 ENCOUNTER — APPOINTMENT (OUTPATIENT)
Dept: GENERAL RADIOLOGY | Facility: CLINIC | Age: 34
DRG: 497 | End: 2019-08-14
Attending: THORACIC SURGERY (CARDIOTHORACIC VASCULAR SURGERY)
Payer: COMMERCIAL

## 2019-08-14 ENCOUNTER — APPOINTMENT (OUTPATIENT)
Dept: OCCUPATIONAL THERAPY | Facility: CLINIC | Age: 34
DRG: 497 | End: 2019-08-14
Attending: THORACIC SURGERY (CARDIOTHORACIC VASCULAR SURGERY)
Payer: COMMERCIAL

## 2019-08-14 VITALS
DIASTOLIC BLOOD PRESSURE: 74 MMHG | HEART RATE: 90 BPM | SYSTOLIC BLOOD PRESSURE: 116 MMHG | WEIGHT: 139.4 LBS | RESPIRATION RATE: 16 BRPM | HEIGHT: 63 IN | BODY MASS INDEX: 24.7 KG/M2 | TEMPERATURE: 98.1 F | OXYGEN SATURATION: 97 %

## 2019-08-14 LAB
ANION GAP SERPL CALCULATED.3IONS-SCNC: 8 MMOL/L (ref 3–14)
BASOPHILS # BLD AUTO: 0.1 10E9/L (ref 0–0.2)
BASOPHILS NFR BLD AUTO: 0.9 %
BUN SERPL-MCNC: 17 MG/DL (ref 7–30)
CALCIUM SERPL-MCNC: 8.6 MG/DL (ref 8.5–10.1)
CHLORIDE SERPL-SCNC: 109 MMOL/L (ref 94–109)
CO2 SERPL-SCNC: 28 MMOL/L (ref 20–32)
CREAT SERPL-MCNC: 0.77 MG/DL (ref 0.52–1.04)
DIFFERENTIAL METHOD BLD: NORMAL
EOSINOPHIL # BLD AUTO: 0.1 10E9/L (ref 0–0.7)
EOSINOPHIL NFR BLD AUTO: 2.2 %
ERYTHROCYTE [DISTWIDTH] IN BLOOD BY AUTOMATED COUNT: 12.5 % (ref 10–15)
GFR SERPL CREATININE-BSD FRML MDRD: >90 ML/MIN/{1.73_M2}
GLUCOSE SERPL-MCNC: 91 MG/DL (ref 70–99)
HCT VFR BLD AUTO: 37.3 % (ref 35–47)
HGB BLD-MCNC: 11.8 G/DL (ref 11.7–15.7)
IMM GRANULOCYTES # BLD: 0 10E9/L (ref 0–0.4)
IMM GRANULOCYTES NFR BLD: 0.3 %
LYMPHOCYTES # BLD AUTO: 2 10E9/L (ref 0.8–5.3)
LYMPHOCYTES NFR BLD AUTO: 34.1 %
MCH RBC QN AUTO: 30.3 PG (ref 26.5–33)
MCHC RBC AUTO-ENTMCNC: 31.6 G/DL (ref 31.5–36.5)
MCV RBC AUTO: 96 FL (ref 78–100)
MONOCYTES # BLD AUTO: 0.5 10E9/L (ref 0–1.3)
MONOCYTES NFR BLD AUTO: 8.2 %
NEUTROPHILS # BLD AUTO: 3.2 10E9/L (ref 1.6–8.3)
NEUTROPHILS NFR BLD AUTO: 54.3 %
NRBC # BLD AUTO: 0 10*3/UL
NRBC BLD AUTO-RTO: 0 /100
PLATELET # BLD AUTO: 205 10E9/L (ref 150–450)
POTASSIUM SERPL-SCNC: 3.8 MMOL/L (ref 3.4–5.3)
RBC # BLD AUTO: 3.9 10E12/L (ref 3.8–5.2)
SODIUM SERPL-SCNC: 145 MMOL/L (ref 133–144)
WBC # BLD AUTO: 5.9 10E9/L (ref 4–11)

## 2019-08-14 PROCEDURE — 94640 AIRWAY INHALATION TREATMENT: CPT

## 2019-08-14 PROCEDURE — 97530 THERAPEUTIC ACTIVITIES: CPT | Mod: GO | Performed by: OCCUPATIONAL THERAPIST

## 2019-08-14 PROCEDURE — 40000275 ZZH STATISTIC RCP TIME EA 10 MIN

## 2019-08-14 PROCEDURE — 36415 COLL VENOUS BLD VENIPUNCTURE: CPT | Performed by: STUDENT IN AN ORGANIZED HEALTH CARE EDUCATION/TRAINING PROGRAM

## 2019-08-14 PROCEDURE — 71046 X-RAY EXAM CHEST 2 VIEWS: CPT

## 2019-08-14 PROCEDURE — 25000132 ZZH RX MED GY IP 250 OP 250 PS 637: Performed by: SURGERY

## 2019-08-14 PROCEDURE — 80048 BASIC METABOLIC PNL TOTAL CA: CPT | Performed by: STUDENT IN AN ORGANIZED HEALTH CARE EDUCATION/TRAINING PROGRAM

## 2019-08-14 PROCEDURE — 85025 COMPLETE CBC W/AUTO DIFF WBC: CPT | Performed by: STUDENT IN AN ORGANIZED HEALTH CARE EDUCATION/TRAINING PROGRAM

## 2019-08-14 PROCEDURE — 25000125 ZZHC RX 250: Performed by: SURGERY

## 2019-08-14 RX ORDER — POLYETHYLENE GLYCOL 3350 17 G/17G
1 POWDER, FOR SOLUTION ORAL DAILY
Qty: 7 PACKET | Refills: 0 | Status: SHIPPED | OUTPATIENT
Start: 2019-08-14 | End: 2019-09-11

## 2019-08-14 RX ORDER — AMOXICILLIN 250 MG
2 CAPSULE ORAL 2 TIMES DAILY
Qty: 14 TABLET | Refills: 0 | Status: SHIPPED | OUTPATIENT
Start: 2019-08-14 | End: 2019-09-11

## 2019-08-14 RX ORDER — BISACODYL 10 MG
10 SUPPOSITORY, RECTAL RECTAL DAILY PRN
Status: DISCONTINUED | OUTPATIENT
Start: 2019-08-14 | End: 2019-08-14 | Stop reason: HOSPADM

## 2019-08-14 RX ORDER — CYCLOBENZAPRINE HCL 10 MG
10 TABLET ORAL 3 TIMES DAILY PRN
Qty: 21 TABLET | Refills: 0 | Status: SHIPPED | OUTPATIENT
Start: 2019-08-14 | End: 2019-09-11

## 2019-08-14 RX ORDER — OXYCODONE HYDROCHLORIDE 5 MG/1
5 TABLET ORAL EVERY 4 HOURS PRN
Qty: 20 TABLET | Refills: 0 | Status: SHIPPED | OUTPATIENT
Start: 2019-08-14 | End: 2019-09-11

## 2019-08-14 RX ORDER — ACETAMINOPHEN 325 MG/1
650 TABLET ORAL EVERY 6 HOURS PRN
COMMUNITY
Start: 2019-08-14 | End: 2019-09-11

## 2019-08-14 RX ADMIN — SENNOSIDES AND DOCUSATE SODIUM 2 TABLET: 8.6; 5 TABLET ORAL at 08:09

## 2019-08-14 RX ADMIN — GABAPENTIN 300 MG: 100 CAPSULE ORAL at 08:09

## 2019-08-14 RX ADMIN — IPRATROPIUM BROMIDE AND ALBUTEROL SULFATE 3 ML: .5; 3 SOLUTION RESPIRATORY (INHALATION) at 07:49

## 2019-08-14 RX ADMIN — LORATADINE 10 MG: 10 TABLET ORAL at 08:09

## 2019-08-14 ASSESSMENT — ACTIVITIES OF DAILY LIVING (ADL)
ADLS_ACUITY_SCORE: 12
ADLS_ACUITY_SCORE: 14

## 2019-08-14 NOTE — PLAN OF CARE
"/74 (BP Location: Left arm)   Pulse 90   Temp 98.1  F (36.7  C) (Oral)   Resp 16   Ht 1.6 m (5' 3\")   Wt 63.2 kg (139 lb 6.4 oz)   LMP 07/19/2019 (Exact Date)   SpO2 97%   BMI 24.69 kg/m      VSS on RA. A&Ox4, voiding adequately and +BM this shift. Incision dressing CDI, chest tube removal site CDI, EARL. IVs discontinued, and discharge education completed. Patient discharged today;  left unit with her mother to discharge pharmacy and front door.  "

## 2019-08-14 NOTE — PLAN OF CARE
"/63 (BP Location: Left arm)   Pulse 93   Temp 98.1  F (36.7  C) (Oral)   Resp 18   Ht 1.6 m (5' 3\")   Wt 63.2 kg (139 lb 6.4 oz)   LMP 07/19/2019 (Exact Date)   SpO2 98%   BMI 24.69 kg/m      Neuro: A&Ox4; calls appropriately  Respiratory: sats 98% on RA. Denies SOB. Denies chest pain.   Cardiac: pressures continue to be soft. HR: WNL  GI/: +BS, +passing flatus, no BM shift. Voiding adequately, not saving.   Diet: Regular, tolerating well  Pain: C/O Left incisional (old chest tube site pain). Currently managed with On Q pump at 14 mL/hr. Received a 6 mL bolus from anesthesia at approximately 1815 this evening. Also on scheduled toradol and received one dose of prn: flexeril.   Incisions/Drains: Left old chest tube site; dressing intact, small drainage. Left incision; dermabonded, no drainage.   IV Access: 1 left and 1 right PIV- saline locked.   Labs: Reviewed   Activity: Up independently  New changes this shift: Chest tube was removed. Chest xray completed this shift.   Plan: Continue plan of care. Possible discharge tomorrow.     "

## 2019-08-14 NOTE — PLAN OF CARE
Discharge Planner OT   Patient plan for discharge: Home with assist  Current status: All goals met; no AE needs.  Independent with mobility, stairs, and ADLs without AE.  Barriers to return to prior living situation: None  Recommendations for discharge: Home with assist  Rationale for recommendations: No further inpatient or home therapy needs    Occupational Therapy Discharge Summary    Reason for therapy discharge:    All goals and outcomes met, no further needs identified.    Progress towards therapy goal(s). See goals on Care Plan in [facility] electronic health record for goal details.  Goals met    Therapy recommendation(s):    No further therapy is recommended.           Entered by: Araseli Al 08/14/2019 10:10 AM

## 2019-08-14 NOTE — PLAN OF CARE
"/62 (BP Location: Left arm)   Pulse 90   Temp 96.8  F (36  C) (Oral)   Resp 16   Ht 1.6 m (5' 3\")   Wt 63.2 kg (139 lb 6.4 oz)   LMP 07/19/2019 (Exact Date)   SpO2 94%   BMI 24.69 kg/m      Vitals: BPs soft, 90-100s/ 50s.   Activity: Up ad hayden.  Pain: On-Q in place, infusing at 14 mL/hr. Pt refused scheduled tylenol this AM.   Neuro: A&Ox4. Calls appropriately. L eye drooping/swelling. Pt reports she noticed yesterday AM.   Respiratory:  LS diminished in bases bilaterally.   GI/: Voiding, not saving. +BS. No BM since surgery- MD ordered suppository this AM.  Diet: Regular diet.   Lines: PIV x2 SL.   Skin/Wounds: L old CT site, scant drainage. L chest incision, derma bond, CDI.  Plan: Possible discharge today. Continue to monitor and follow POC.   "

## 2019-08-14 NOTE — PROGRESS NOTES
REGIONAL ANESTHESIA PAIN SERVICE CONTINUOUS NERVE INFUSION NOTE  Keri Ornelas is a 33 year old female POD #2 s/p WEDGE RESECTION, LUNG, THORACOSCOPIC  THORACOTOMY and placement of left T6-7 erector spinae (ES) catheter for pain control.    SUBJECTIVE:  Interval History: No acute events overnight.  L) CT removed yesterday, plans to discharge home today.  Patient received nerve block local anesthetic bolus at 1830, reports good pain control with nerve block continuous infusion and current analgesics.  Denies weakness, paresthesias. Patient reports she would nerve block removed prior to discharge because she is tender at insertion site, likely because the nerve block catheter was pulled tight a couple times yesterday with movement.  She is coughing, using IS and ambulating without difficulty.  Tolerating regular diet, denies nausea.     Clinically Aligned Pain Assessment (CAPA):   Comfort (How is your pain?): Comfortably manageable  Change in Pain (Since your last medication/intervention?): About the same  Pain Control (How are your pain treatments working?):  Partially effective pain control  Functioning (Are you able to do activities to get better?) : Can do most things, but pain gets in the way of some, coughing and movement of left arm exacerbates pain        Antithrombotic/Thrombolytic Therapy ordered:  Last dose enoxaparin administered yesterday at 1219    Analgesic Medications:   Medications related to Pain Management (From now, onward)    Start     Dose/Rate Route Frequency Ordered Stop    08/14/19 0635  bisacodyl (DULCOLAX) Suppository 10 mg      10 mg Rectal DAILY PRN 08/14/19 0636      08/14/19 0000  acetaminophen (TYLENOL) 325 MG tablet      650 mg Oral EVERY 6 HOURS PRN 08/14/19 0900      08/14/19 0000  cyclobenzaprine (FLEXERIL) 10 MG tablet      10 mg Oral 3 TIMES DAILY PRN 08/14/19 0900      08/14/19 0000  oxyCODONE (ROXICODONE) 5 MG tablet      5 mg Oral EVERY 4 HOURS PRN 08/14/19 0900      08/14/19  0000  senna-docusate (SENOKOT-S/PERICOLACE) 8.6-50 MG tablet     Note to Pharmacy:  Continue while requiring narcotic pain control    2 tablet Oral 2 TIMES DAILY 08/14/19 0900      08/14/19 0000  polyethylene glycol (MIRALAX/GLYCOLAX) packet     Note to Pharmacy:  Continue while requiring narcotic pain control    1 packet Oral DAILY 08/14/19 0900 08/13/19 0800  Lidocaine (LIDOCARE) 4 % Patch 1 patch      1 patch  over 12 Hours Transdermal EVERY 24 HOURS 0800 08/12/19 2028 08/13/19 0720  HYDROmorphone (DILAUDID) injection 0.2 mg      0.2 mg Intravenous EVERY 2 HOURS PRN 08/13/19 0720 08/13/19 0719  cyclobenzaprine (FLEXERIL) tablet 10 mg      10 mg Oral 3 TIMES DAILY PRN 08/13/19 0719 08/12/19 2030  acetaminophen (TYLENOL) tablet 975 mg      975 mg Oral EVERY 8 HOURS 08/12/19 2028 08/15/19 2029    08/12/19 2030  lidocaine patch in PLACE       Transdermal EVERY 8 HOURS 08/12/19 2028 08/12/19 2030  senna-docusate (SENOKOT-S/PERICOLACE) 8.6-50 MG per tablet 2 tablet      2 tablet Oral 2 TIMES DAILY 08/12/19 2028 08/12/19 2027  diphenhydrAMINE (BENADRYL) capsule 25 mg      25 mg Oral EVERY 6 HOURS PRN 08/12/19 2028 08/12/19 2027  diphenhydrAMINE (BENADRYL) injection 25 mg      25 mg Intravenous EVERY 6 HOURS PRN 08/12/19 2028 08/12/19 2027  oxyCODONE (ROXICODONE) tablet 5-10 mg      5-10 mg Oral EVERY 3 HOURS PRN 08/12/19 2028 08/12/19 2000  gabapentin (NEURONTIN) capsule 300 mg      300 mg Oral 2 TIMES DAILY 08/12/19 1738             OBJECTIVE:  Lab results  Recent Labs   Lab Test 08/14/19  0716   WBC 5.9   RBC 3.90   HGB 11.8   HCT 37.3   MCV 96   MCH 30.3   MCHC 31.6   RDW 12.5          No results found for: INR      Vitals:    Temp:  [96.8  F (36  C)-98.1  F (36.7  C)] 98.1  F (36.7  C)  Pulse:  [86-93] 90  Heart Rate:  [80-81] 80  Resp:  [16-18] 16  BP: ()/(48-74) 116/74  SpO2:  [94 %-98 %] 97 %    Exam:   GEN: alert and no distress, sitting crosslegged in  bed and texting on smartphone  NEURO/MSK: Strength BLE 5/5  and overall symmetric  SKIN: left erector spinae (ES) catheter site with dressing c/d/i, tender to touch, no erythema, heme, edema      ASSESSMENT/PLAN:    Keri Ornelas is a 33 year old female POD #2 s/p WEDGE RESECTION, LUNG, THORACOSCOPIC  THORACOTOMY and placement of left T6-7 erector spinae (ES) catheter for pain control.    Patient is receiving adequate analgesia with current multimodal therapy with nerve block catheter infusion of ropivacaine 0.2% at 14 mL/hr.  Motor function intact and is meeting activity goals.  No evidence of adverse side effects related to local anesthetic. Patient declines the offer to discharge home with nerve block catheter/infusion.      - 0830 left erector spinae nerve block catheter removed at the patient's request.  Dark tip intact.  Insertion site clean/dry/intact. Small bandage applied.    - antithrombotic/thrombolytic therapy okay to administer Enoxaparin (Lovenox) SQ as ordered. Please contact RAPS (#[phone]) prior to any medication changes  - will sign off    - discussed plan with attending anesthesiologist    BECK Olson [facility]  Regional Anesthesia Pain Service  8/14/2019 9:08 AM    RAPS Contact Info (24 hour job code pager is the last 4 digits) For in-house use only:   [employer]le phone: [place] [phone], [place] [phone], [email or web address] [phone], then enter call-back number.    Text: Use [employer] on the Intranet <Paging/Directory> tab and enter Jobcode ID.   If no call back at any time, contact the hospital  and ask for RAPS attending or backup

## 2019-08-14 NOTE — DISCHARGE SUMMARY
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    PROCEDURES PERFORMED:  Left video-assisted thoracic surgery resection of upper posterior sulcus chest wall tumor.    PATHOLOGY RESULTS: Pending at the time of discharge     CULTURE RESULTS: None     INTRAOPERATIVE COMPLICATIONS: None     POSTOPERATIVE COMPLICATIONS: None     DRAINS/TUBES PRESENT AT DISCHARGE: None    DATE OF DISCHARGE:  2019    HOSPITAL COURSE: Keri Ornelas is a 33 year old female who on 2019 underwent the above-named procedures.  She tolerated the operation well and postoperatively was transferred to the general post-surgical unit.  The remainder of her course was essentially uncomplicated with the exception of some ptosis of the left eye, which was not unexpected given the location of her tumor.  Prior to discharge, her pain was controlled well, she was able to perform ADLs and ambulate independently without difficulty, and had full return of bowel and bladder function.  On 2019, she was discharged to home in stable condition.    DISCHARGE EXAM:   A&O, NAD  Resp non-labored  Distal extremities warm    Incisions healing well     DISCHARGE INSTRUCTIONS:  Discharge Procedure Orders   X-ray Chest 2 vws*   Standing Status: Future Standing Exp. Date: 19     Order Specific Question Answer Comments   Priority Routine      Reason for your hospital stay   Order Comments: Thoracic surgery     Discharge Instructions   Order Comments: THORACIC SURGERY DISCHARGE INSTRUCTIONS    DIET: Regular diet as tolerated    If your plans upon discharge include prolonged periods of sitting (i.e a lengthy car or plane ride), it is highly beneficial to get up and walk at least once per hour to help prevent swelling and blood clots.     You may remove chest tube dressing 48 hours after tube removal and bandage the site at your own  "discretion thereafter.  Small amounts of leakage are normal for 2-3 days after removal.  Feel free to call with questions.    You may get incision wet 2 days after operation. Do not submerge, soak, or scrub incision or swim until seen in follow-up.    Take incentive spirometer home for continued frequent use    Activity as tolerated, no strenous activity until seen in follow-up, no lifting greater than 20 pounds for the next 2 weeks.    Stay hydrated. Take over the counter fiber (metamucil or benefiber) and stool softeners (Miralax, docusate or senna) if becoming constipated.     Call for fever greater than 101.5, chills, increased size of incision, red skin around incision, vision changes, muscle strength changes, sensation changes, shortness of breath, or other concerns.    No driving while taking narcotic pain medication.    Transition to ibuprofen or tylenol/acetaminophen for pain control. Do not take tylenol/acetaminophen and acetaminophen containing narcotic (e.g., percocet or vicodin) at the same time. If you have known ulcer problems, or kidney trouble (elevated creatinine) do not take the ibuprofen.    In emergencies, call 911    For other Questions or Concerns;   A.) During weekday working hours (Monday through Friday 8am to 4:30pm)   call [phone] and ask to speak to a clinical nurse specialist.     B.) At nights (after 4:30pm), on weekends, or if urgent call [phone] and   tell the  \"I would like to page job code [phone], the thoracic surgery   fellow on call, please.\"     Adult [facility] Follow-up and recommended labs and tests   Order Comments: 1.) Follow up with primary care physician in 1-2 weeks.  2.) Follow up with Dr. Lutz (per his request) in Thoracic Surgery clinic in 1 month, prior to which a CXR should be performed.  3.) Follow up with ophthalmology in 1-2 weeks for left sided ptosis.    Appointments on [place] and/or [facility] (with [facility] provider or " service). Call [phone] if you haven't heard regarding these appointments within 7 days of discharge.       DISCHARGE MEDICATIONS:   Current Discharge Medication List      START taking these medications    Details   acetaminophen (TYLENOL) 325 MG tablet Take 2 tablets (650 mg) by mouth every 6 hours as needed for mild pain    Associated Diagnoses: Schwannoma      cyclobenzaprine (FLEXERIL) 10 MG tablet Take 1 tablet (10 mg) by mouth 3 times daily as needed for muscle spasms  Qty: 21 tablet, Refills: 0    Associated Diagnoses: Schwannoma      oxyCODONE (ROXICODONE) 5 MG tablet Take 1 tablet (5 mg) by mouth every 4 hours as needed for severe pain  Qty: 20 tablet, Refills: 0    Associated Diagnoses: Schwannoma      polyethylene glycol (MIRALAX/GLYCOLAX) packet Take 17 g by mouth daily  Qty: 7 packet, Refills: 0    Comments: Continue while requiring narcotic pain control  Associated Diagnoses: Schwannoma      senna-docusate (SENOKOT-S/PERICOLACE) 8.6-50 MG tablet Take 2 tablets by mouth 2 times daily  Qty: 14 tablet, Refills: 0    Comments: Continue while requiring narcotic pain control  Associated Diagnoses: Schwannoma         CONTINUE these medications which have NOT CHANGED    Details   gabapentin (NEURONTIN) 300 MG capsule Take one pill at bedtime      ketoconazole (NIZORAL) 2 % external shampoo       loratadine (CLARITIN) 10 MG tablet Take 10 mg by mouth as needed for allergies      spironolactone (ALDACTONE) 50 MG tablet Take 100 mg by mouth At Bedtime

## 2019-08-15 NOTE — PROGRESS NOTES
REGIONAL ANESTHESIA PAIN SERVICE CONTINUOUS NERVE INFUSION NOTE  Keri Ornelas is a 33 year old female POD #1 s/p WEDGE RESECTION, LUNG, THORACOSCOPIC  THORACOTOMY and placement of left T6-7 erector spinae (ES) catheter for pain control.    SUBJECTIVE:  Interval History: Overnight no acute events.  Patient seen at 0900 awake, reports no pain at rest and left-sided anterior chest pain 6/10 with deep breathing. Reports pain control with nerve block continuous infusion and current analgesics (see below) and declined nerve block bolus at that time.  Denies weakness, paresthesias, circumoral numbness, metallic taste or tinnitus. Patient ambulating with stanby assistance.  Currently tolerating a regular diet, has nausea with standing, does not think it is related to PCA Dilaudid use.     Clinically Aligned Pain Assessment (CAPA):   Comfort (How is your pain?): Tolerable with discomfort  Change in Pain (Since your last medication/intervention?): About the same  Pain Control (How are your pain treatments working?):  Partially effective pain control  Functioning (Are you able to do activities to get better?) : Can do most things, but pain gets in the way of some       Antithrombotic/Thrombolytic Therapy ordered:    enoxaparin (LOVENOX) injection 40 mg 40 mg, SC, Q24H         Analgesic Medications:   Medications related to Pain Management (From now, onward)    Start     Dose/Rate Route Frequency Ordered Stop    08/13/19 0800  Lidocaine (LIDOCARE) 4 % Patch 1 patch      1 patch  over 12 Hours Transdermal EVERY 24 HOURS 0800 08/12/19 2028 08/13/19 0720  HYDROmorphone (DILAUDID) injection 0.2 mg      0.2 mg Intravenous EVERY 2 HOURS PRN 08/13/19 0720 08/13/19 0719  cyclobenzaprine (FLEXERIL) tablet 10 mg      10 mg Oral 3 TIMES DAILY PRN 08/13/19 0719 08/12/19 2030  acetaminophen (TYLENOL) tablet 975 mg      975 mg Oral EVERY 8 HOURS 08/12/19 2028 08/15/19 2029    08/12/19 2030  lidocaine patch in PLACE        Transdermal EVERY 8 HOURS 08/12/19 2028 08/12/19 2030  senna-docusate (SENOKOT-S/PERICOLACE) 8.6-50 MG per tablet 2 tablet      2 tablet Oral 2 TIMES DAILY 08/12/19 2028 08/12/19 2027  diphenhydrAMINE (BENADRYL) capsule 25 mg      25 mg Oral EVERY 6 HOURS PRN 08/12/19 2028 08/12/19 2027  diphenhydrAMINE (BENADRYL) injection 25 mg      25 mg Intravenous EVERY 6 HOURS PRN 08/12/19 2028 08/12/19 2027  oxyCODONE (ROXICODONE) tablet 5-10 mg      5-10 mg Oral EVERY 3 HOURS PRN 08/12/19 2028 08/12/19 2000  gabapentin (NEURONTIN) capsule 300 mg      300 mg Oral 2 TIMES DAILY 08/12/19 1738 08/12/19 1745  ketorolac (TORADOL) injection 15 mg     Note to Pharmacy:  IF celecoxib was given pre-operatively, start ketorolac 12 hours after celecoxib given.    15 mg Intravenous EVERY 6 HOURS 08/12/19 1743 08/13/19 2059 08/12/19 1215  ropivacaine 0.2% (NAROPIN) 750 mL in ON-Q C-Bloc select flow ([id number] holds 600-750 mL) single cath disposable pump      14 mL/hr  Irrigation Continuous Nerve Block 08/12/19 1207             OBJECTIVE:  Lab results  Recent Labs   Lab Test 08/13/19  0801   WBC 17.8*   RBC 4.25   HGB 13.0   HCT 39.3   MCV 93   MCH 30.6   MCHC 33.1   RDW 12.0          No results found for: INR      Vitals:         Exam:   GEN: alert, no distress at rest and mild distress movement and deep breathing  NEURO/MSK: Extent of sensory blockade: Tested with alcohol swab: decreased sensation to cold L) T3-T6,  Strength BLE 5/5  and overall symmetric  SKIN: left erector spinae (ES) catheter site with dressing c/d/i, no tenderness, erythema, heme, edema      ASSESSMENT  Keri Ornelas is a 33 year old female POD #1 s/p WEDGE RESECTION, LUNG, THORACOSCOPIC  THORACOTOMY and placement of left T6-7 erector spinae (ES) catheter for pain control.    Patient is receiving modest analgesia with current multimodal therapy including left erector spinae (ES) catheter with ropivacaine 0.2% at 14mL/hr.  Motor  function intact and adequate sensory block, patient is meeting activity goals.  No evidence of adverse side effects related to local anesthetic.     PLAN  - continue ropivacaine 0.2% infusion rate at 14 mL/hr  - discontinue lidocaine patch order.  Patient can have local anesthetic bolus Q 12 hr PRN for pain related to incision and CT  - expected change of next On-Q pump is tomorrow  - antithrombotic/thrombolytic therapy okay to continue Enoxaparin (Lovenox) SQ as ordered. Please contact RAPS (#[phone]) prior to any medication changes    1130 Cinician administered nerve block bolus  - Current vital signs: /59, P 77, MAP 81  - MEDICATION: PF bupivacaine 0.25%, Total 6mL via nerve block catheter  - PROCEDURE: Clinician bolus administered without complication, negative aspirate before and between each mL.    No symptoms of local anesthetic systemic toxicity (LAST).   Remained with and assessed patient for 10 min post-injection. BP, P, and MAP stable  - POST-PROCEDURE: Bedside RN aware of need to continue to monitoring and document BP, P, and MAP Q 10 min for an additional 20 min. Contact RAPS (jobcode ID [id number]) if any of the following: patient experiencing any untoward effects, SBP < 90, P < 50 or > 120, MAP < 60     1200 Follow up: Patient reports pain unchanged at rest and 5-6/10 with activity.        - patient can be evaluated to receive local anesthetic bolus Q 12 hr PRN pain not controlled with continuous infusion.  Bedside RN must page RAPS to request bolus  - will continue to follow and adjust as needed    Jaya Swenson MD    Department of Anesthesiology  Pain Management Division

## 2019-08-15 NOTE — PROGRESS NOTES
REGIONAL ANESTHESIA PAIN SERVICE CONTINUOUS NERVE INFUSION NOTE  Keri Ornelas is a 33 year old female POD #2 s/p WEDGE RESECTION, LUNG, THORACOSCOPIC  THORACOTOMY and placement of left T6-7 erector spinae (ES) catheter for pain control.    SUBJECTIVE:  Interval History: No acute events overnight.  L) CT removed yesterday, plans to discharge home today.  Patient received nerve block local anesthetic bolus at 1830, reports good pain control with nerve block continuous infusion and current analgesics.  Denies weakness, paresthesias. Patient reports she would nerve block removed prior to discharge because she is tender at insertion site, likely because the nerve block catheter was pulled tight a couple times yesterday with movement.  She is coughing, using IS and ambulating without difficulty.  Tolerating regular diet, denies nausea.     Clinically Aligned Pain Assessment (CAPA):   Comfort (How is your pain?): Comfortably manageable  Change in Pain (Since your last medication/intervention?): About the same  Pain Control (How are your pain treatments working?):  Partially effective pain control  Functioning (Are you able to do activities to get better?) : Can do most things, but pain gets in the way of some, coughing and movement of left arm exacerbates pain        Antithrombotic/Thrombolytic Therapy ordered:  Last dose enoxaparin administered yesterday at 1219    Analgesic Medications:   Medications related to Pain Management (From now, onward)    Start     Dose/Rate Route Frequency Ordered Stop    08/14/19 0635  bisacodyl (DULCOLAX) Suppository 10 mg      10 mg Rectal DAILY PRN 08/14/19 0636      08/14/19 0000  acetaminophen (TYLENOL) 325 MG tablet      650 mg Oral EVERY 6 HOURS PRN 08/14/19 0900      08/14/19 0000  cyclobenzaprine (FLEXERIL) 10 MG tablet      10 mg Oral 3 TIMES DAILY PRN 08/14/19 0900      08/14/19 0000  oxyCODONE (ROXICODONE) 5 MG tablet      5 mg Oral EVERY 4 HOURS PRN 08/14/19 0900      08/14/19  0000  senna-docusate (SENOKOT-S/PERICOLACE) 8.6-50 MG tablet     Note to Pharmacy:  Continue while requiring narcotic pain control    2 tablet Oral 2 TIMES DAILY 08/14/19 0900      08/14/19 0000  polyethylene glycol (MIRALAX/GLYCOLAX) packet     Note to Pharmacy:  Continue while requiring narcotic pain control    1 packet Oral DAILY 08/14/19 0900 08/13/19 0800  Lidocaine (LIDOCARE) 4 % Patch 1 patch      1 patch  over 12 Hours Transdermal EVERY 24 HOURS 0800 08/12/19 2028 08/13/19 0720  HYDROmorphone (DILAUDID) injection 0.2 mg      0.2 mg Intravenous EVERY 2 HOURS PRN 08/13/19 0720 08/13/19 0719  cyclobenzaprine (FLEXERIL) tablet 10 mg      10 mg Oral 3 TIMES DAILY PRN 08/13/19 0719 08/12/19 2030  acetaminophen (TYLENOL) tablet 975 mg      975 mg Oral EVERY 8 HOURS 08/12/19 2028 08/15/19 2029    08/12/19 2030  lidocaine patch in PLACE       Transdermal EVERY 8 HOURS 08/12/19 2028 08/12/19 2030  senna-docusate (SENOKOT-S/PERICOLACE) 8.6-50 MG per tablet 2 tablet      2 tablet Oral 2 TIMES DAILY 08/12/19 2028 08/12/19 2027  diphenhydrAMINE (BENADRYL) capsule 25 mg      25 mg Oral EVERY 6 HOURS PRN 08/12/19 2028 08/12/19 2027  diphenhydrAMINE (BENADRYL) injection 25 mg      25 mg Intravenous EVERY 6 HOURS PRN 08/12/19 2028 08/12/19 2027  oxyCODONE (ROXICODONE) tablet 5-10 mg      5-10 mg Oral EVERY 3 HOURS PRN 08/12/19 2028 08/12/19 2000  gabapentin (NEURONTIN) capsule 300 mg      300 mg Oral 2 TIMES DAILY 08/12/19 1738             OBJECTIVE:  Lab results  Recent Labs   Lab Test 08/14/19  0716   WBC 5.9   RBC 3.90   HGB 11.8   HCT 37.3   MCV 96   MCH 30.3   MCHC 31.6   RDW 12.5          No results found for: INR      Vitals:         Exam:   GEN: alert and no distress, sitting crosslegged in bed and texting on smartphone  NEURO/MSK: Strength BLE 5/5  and overall symmetric  SKIN: left erector spinae (ES) catheter site with dressing c/d/i, tender to touch, no erythema,  heme, edema      ASSESSMENT/PLAN:    Keri Ornelas is a 33 year old female POD #2 s/p WEDGE RESECTION, LUNG, THORACOSCOPIC  THORACOTOMY and placement of left T6-7 erector spinae (ES) catheter for pain control.    Patient is receiving adequate analgesia with current multimodal therapy with nerve block catheter infusion of ropivacaine 0.2% at 14 mL/hr.  Motor function intact and is meeting activity goals.  No evidence of adverse side effects related to local anesthetic. Patient declines the offer to discharge home with nerve block catheter/infusion.      - 0830 left erector spinae nerve block catheter removed at the patient's request.  Dark tip intact.  Insertion site clean/dry/intact. Small bandage applied.    - antithrombotic/thrombolytic therapy okay to administer Enoxaparin (Lovenox) SQ as ordered. Please contact RAPS (#[phone]) prior to any medication changes  - will sign off    -Jaya Swenson MD    Department of Anesthesiology  Pain Management Division

## 2019-08-16 ENCOUNTER — TELEPHONE (OUTPATIENT)
Dept: OPHTHALMOLOGY | Facility: CLINIC | Age: 34
End: 2019-08-16

## 2019-08-16 LAB — COPATH REPORT: NORMAL

## 2019-08-16 NOTE — TELEPHONE ENCOUNTER
Called 8-20-19 at 1606  Left message with direct number for scheduling  Pablo Soto RN RN 4:07 PM 08/20/19      Pt currently scheduled 9-17-19 with Dr. Trena Soto RN RN 11:26 AM 08/21/19      ---    Phone:   [phone]    No answer-- many rings, no voicemail   Pablo Soto RN 3:30 PM 08/16/19          [facility] Call Center    Phone Message    May a detailed message be left on voicemail: yes    Reason for Call: Other: Per patients [facility] Discharge Orders from 08/12/2019, they would like this patient to follow-up in Ophthalmology in 1-2 weeks for left eye ptosis. Please follow-up with this patient to discuss scheduling options.      Action Taken: Message routed to:  [facility] ([facility] Eye

## 2019-08-16 NOTE — PROGRESS NOTES
"Patient was called three times and no answer so post 24 hr DC follow up calls will be closed out, message was left with contact number for department seen by or following up     For other Questions or Concerns;                 A.) During weekday working hours (Monday through Friday 8am to 4:30pm)                 call [phone]) and ask to speak to a clinical nurse specialist.                    B.) At nights (after 4:30pm), on weekends, or if urgent call [phone] and                 tell the  \"I would like to page job code [id number], the thoracic surgery                 fellow on call, please.\"  "

## 2019-08-21 ENCOUNTER — PRE VISIT (OUTPATIENT)
Dept: OPHTHALMOLOGY | Facility: CLINIC | Age: 34
End: 2019-08-21

## 2019-09-10 NOTE — PROGRESS NOTES
THORACIC SURGERY FOLLOW UP VISIT     Dear Umang Burns,  I saw Ms. Ornelas in follow-up today (last seen 812-8/14). 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     COMPLICATIONS  Cristiano's syndrome       INTERVAL STUDIES  CXR 9/11: no pneumothorax      Physical examination  BMI 24  Left ptosis slightly improved, miosis.   Left upper extremity neuro exam intact. Sensation and motor function preserved.      ETOH socially  TOB never    PMH  Past Medical History:   Diagnosis Date     Acute postinfectious glomerulonephritis      Allergic rhinitis      Anxiety      Asthma      Brittle hair      Psoriasis      Schwannoma        PSH  Past Surgical History:   Procedure Laterality Date     ADENOIDECTOMY  2005     MYRINGOTOMY, INSERT TUBE, COMBINED       SEPTOPLASTY, TURBINOPLASTY, COMBINED  01/2019     THORACOSCOPIC WEDGE RESECTION LUNG Left 8/12/2019    Procedure: Left Video Assisted Thoracoscopic Resection Superior Sulcus Tumor;  Surgeon: Delamr Ramirez MD;  Location: [facility] OR        SUBJECTIVE   Ms. Ornelas is doing very well after surgery. She is no longer having the left upper chest pain that brougth her to seek for medical attention. She has occasional left shoulder pain and left upper chest hypoesthesia. She noticed that her Cristiano's syndrome has improved since surgery however has plateaued over the last couple of weeks. She has no fever or chills. No cough or hemoptysis.      From a personal perspective, she comes to clinic by herself.      IMPRESSION (D36.10) Schwannoma  (primary encounter diagnosis)    33 year old female one month after left VATS resection of superior sulcus Schwannoma.      PLAN  I reviewed the plan as follows:  1) I discussed the pathology report with her and reassured her about the benign  diagnosis and negative margins.   2) I will see her again in clinic in 2 months with a baseline CT scan, and to reassess her Cristiano's syndrome progression.   3) She is scheduled to see ophthalmology for Cristiano's syndrome.   They had all their questions answered and were in agreement with the plan.  I appreciate the opportunity to participate in the care of your patient and will keep you updated.  Sincerely,

## 2019-09-11 ENCOUNTER — ANCILLARY PROCEDURE (OUTPATIENT)
Dept: GENERAL RADIOLOGY | Facility: CLINIC | Age: 34
End: 2019-09-11
Attending: PHYSICIAN ASSISTANT
Payer: COMMERCIAL

## 2019-09-11 ENCOUNTER — OFFICE VISIT (OUTPATIENT)
Dept: SURGERY | Facility: CLINIC | Age: 34
End: 2019-09-11
Attending: THORACIC SURGERY (CARDIOTHORACIC VASCULAR SURGERY)
Payer: COMMERCIAL

## 2019-09-11 VITALS
HEART RATE: 96 BPM | HEIGHT: 63 IN | BODY MASS INDEX: 24.06 KG/M2 | DIASTOLIC BLOOD PRESSURE: 92 MMHG | TEMPERATURE: 97.7 F | SYSTOLIC BLOOD PRESSURE: 135 MMHG | OXYGEN SATURATION: 99 % | RESPIRATION RATE: 16 BRPM | WEIGHT: 135.8 LBS

## 2019-09-11 DIAGNOSIS — D36.10 SCHWANNOMA: ICD-10-CM

## 2019-09-11 DIAGNOSIS — D36.10 SCHWANNOMA: Primary | ICD-10-CM

## 2019-09-11 PROCEDURE — G0463 HOSPITAL OUTPT CLINIC VISIT: HCPCS | Mod: ZF

## 2019-09-11 ASSESSMENT — MIFFLIN-ST. JEOR: SCORE: 1300.11

## 2019-09-11 ASSESSMENT — PAIN SCALES - GENERAL: PAINLEVEL: MILD PAIN (2)

## 2019-09-11 NOTE — NURSING NOTE
"Oncology Rooming Note    September 11, 2019 2:07 PM   Keri Ornelas is a 33 year old female who presents for:    Chief Complaint   Patient presents with     Oncology Clinic Visit     RETURN VISIT; SCHWANNOMA; VITALS TAKEN      Initial Vitals: BP (!) 135/92   Pulse 96   Temp 97.7  F (36.5  C) (Oral)   Resp 16   Ht 1.6 m (5' 3\")   Wt 61.6 kg (135 lb 12.8 oz)   SpO2 99%   BMI 24.06 kg/m   Estimated body mass index is 24.06 kg/m  as calculated from the following:    Height as of this encounter: 1.6 m (5' 3\").    Weight as of this encounter: 61.6 kg (135 lb 12.8 oz). Body surface area is 1.65 meters squared.  Mild Pain (2) Comment: Data Unavailable   No LMP recorded.  Allergies reviewed: Yes  Medications reviewed: Yes    Medications: Medication refills not needed today.  Pharmacy name entered into [facility]: [facility] [id number] [place] [address]    Clinical concerns: Patient would like to discuss symptoms of her eye with you.  Dr Lutz was notified.      Donna Carney              "

## 2019-09-17 ENCOUNTER — TELEPHONE (OUTPATIENT)
Dept: OPHTHALMOLOGY | Facility: CLINIC | Age: 34
End: 2019-09-17

## 2019-09-17 ENCOUNTER — OFFICE VISIT (OUTPATIENT)
Dept: OPHTHALMOLOGY | Facility: CLINIC | Age: 34
End: 2019-09-17
Payer: COMMERCIAL

## 2019-09-17 DIAGNOSIS — G90.2 HORNER'S SYNDROME: Primary | ICD-10-CM

## 2019-09-17 DIAGNOSIS — H02.402 PTOSIS OF LEFT EYELID: ICD-10-CM

## 2019-09-17 DIAGNOSIS — D36.10 SCHWANNOMA: ICD-10-CM

## 2019-09-17 ASSESSMENT — CONF VISUAL FIELD
METHOD: COUNTING FINGERS
OS_NORMAL: 1
OD_NORMAL: 1

## 2019-09-17 ASSESSMENT — VISUAL ACUITY
OS_SC: 20/25
OD_SC+: -2
METHOD: SNELLEN - LINEAR
OD_SC: 20/30

## 2019-09-17 ASSESSMENT — TONOMETRY
IOP_METHOD: ICARE
OD_IOP_MMHG: 17
OS_IOP_MMHG: 18

## 2019-09-17 ASSESSMENT — MARGIN REFLEX DISTANCE
OD_MRD1: 3
OS_MRD1: 1

## 2019-09-17 ASSESSMENT — SLIT LAMP EXAM - LIDS: COMMENTS: NORMAL

## 2019-09-17 NOTE — TELEPHONE ENCOUNTER
Met with patient to schedule surgery with Dr. Jimmy Albrecht.    Surgery was scheduled on 12/05 at [facility]  Patient will have H&P at [facility]   Post-Op care appointment was scheduled on 12/17  Patient is aware a / is needed day of surgery.   Patient received surgery packet has my direct contact information for any further questions.

## 2019-09-17 NOTE — LETTER
2019         RE:  :  MRN: Keri Ornelas  1985  [id number]     Dear Dr. Sim,    Thank you for asking me to see your patient, Keri Ornelas, for an oculoplastic   consultation.  My assessment and plan are below.  For further details, please see my attached clinic note.                HPI:     Chief Complaint(s) and History of Present Illness(es)     Droopy Eye Lid Evaluation     Laterality: left upper lid    Onset: new    Duration: 2 months    Course: stable    Associated signs and symptoms: Negative for blurred vision, double vision   and eye pain    Response to treatment: no improvement    Pain scale: 0/10              Comments     Keri Ornelas is being seen today at the request of Dr. Delmar Sim for Droopy eye lid after brain tumor surgery 2019, Cristiano's   Syndrome. Pt states had resection of thoracic schwannoma on the left side surgery in August and has since had a droopy left upper lid, constant. Pt left pupil doesn't work the same as the right pupil. No pain. No vision changes.     Morteza Babin, COT COT 8:09 AM 2019     The droopy eyelid is interfering with activities of daily living including driving, and reading. The patient denies double vision, variability of the eyelid position, or dry eye symptoms.     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 Cristiano's syndrome    2. Ptosis of left eyelid    3. Schwannoma    Secondary to resection of thoracic schwannoma     Left ptosis repair MMCR 8.5 mm    She is only one month out from resection of schwannoma so will schedule in December, and cancel if improves on its own.  Discussed will not change her pupil size     ANTICOAGULATION:        Again, thank you for allowing me to participate in the care of your patient.       Sincerely,    Jimmy Albrecht MD  Department of Ophthalmology and Visual Neurosciences  [facility]    CC: Delmar Sim MD  [address] [place] [address]  [facility] [address]  VIA In Basket

## 2019-09-17 NOTE — PROGRESS NOTES
Oculoplastic Clinic New Patient    Patient: Keri Ornelas MRN# [id number]   YOB: 1985 Age: 33 year old   Date of Visit: Sep 17, 2019    CC: Droopy eyelids obstructing vision.              HPI:     Chief Complaint(s) and History of Present Illness(es)     Droopy Eye Lid Evaluation     Laterality: left upper lid    Onset: new    Duration: 2 months    Course: stable    Associated signs and symptoms: Negative for blurred vision, double vision   and eye pain    Response to treatment: no improvement    Pain scale: 0/10              Comments     Keri Ornelas is being seen today at the request of Dr. Delmar Sim for Droopy eye lid after brain tumor surgery 8/12/2019, Cristiano's   Syndrome. Pt states had resection of thoracic schwannoma on the left side surgery in August and has since had a droopy left upper lid, constant. Pt left pupil doesn't work the same as the right pupil. No pain. No vision changes.     Morteza Babin, COT COT 8:09 AM September 17, 2019     The droopy eyelid is interfering with activities of daily living including driving, and reading. The patient denies double vision, variability of the eyelid position, or dry eye symptoms.     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 Cristiano's syndrome    2. Ptosis of left eyelid    3. Schwannoma    Secondary to resection of thoracic schwannoma     Left ptosis repair MMCR 8.5 mm    She is only one month out from resection of schwannoma so will schedule in December, and cancel if improves on its own.  Discussed will not change her pupil size     ANTICOAGULATION:         PHOTOS DEMONSTRATE:    Blepharoptosis    Attending Physician Attestation:  Complete documentation of historical and exam elements from today's encounter can be found in the full encounter  summary report (not reduplicated in this progress note).  I personally obtained the chief complaint(s) and history of present illness.  I confirmed and edited as necessary the review of systems, past medical/surgical history, family history, social history, and examination findings as documented by others; and I examined the patient myself.  I personally reviewed the relevant tests, images, and reports as documented above.  I formulated and edited as necessary the assessment and plan and discussed the findings and management plan with the patient and family. - Jimmy Albrecht MD        Today with Keri Ornelas, I reviewed the indications, risks, benefits, and alternatives of the proposed surgical procedure including, but not limited to, failure obtain the desired result  and need for additional surgery, bleeding, infection, loss of vision, loss of the eye, and the remote possibility of permanent damage to any organ system or death with the use of anesthesia.  I provided multiple opportunities for the questions, answered all questions to the best of my ability, and confirmed that my answers and my discussion were understood.

## 2019-09-17 NOTE — NURSING NOTE
Chief Complaints and History of Present Illnesses   Patient presents with     Droopy Eye Lid Evaluation     Chief Complaint(s) and History of Present Illness(es)     Droopy Eye Lid Evaluation     Laterality: left upper lid    Onset: new    Duration: 2 months    Course: stable    Associated signs and symptoms: Negative for blurred vision, double vision and eye pain    Response to treatment: no improvement    Pain scale: 0/10              Comments     Keri Ornelas is being seen today at the request of Dr. Delmar Sim for Droopy eye lid after brain tumor surgery 8/12/2019, Cristiano's Syndrome. Pt states had brain surgery in August and has since had a droopy left upper lid, constant. Pt left pupil doesn't work the same as the right pupil. No pain. No vision changes.     Morteza Babin, COT COT 8:09 AM September 17, 2019

## 2019-11-13 ENCOUNTER — OFFICE VISIT (OUTPATIENT)
Dept: SURGERY | Facility: CLINIC | Age: 34
End: 2019-11-13
Attending: THORACIC SURGERY (CARDIOTHORACIC VASCULAR SURGERY)
Payer: COMMERCIAL

## 2019-11-13 ENCOUNTER — ANCILLARY PROCEDURE (OUTPATIENT)
Dept: CT IMAGING | Facility: CLINIC | Age: 34
End: 2019-11-13
Attending: CLINICAL NURSE SPECIALIST
Payer: COMMERCIAL

## 2019-11-13 VITALS
TEMPERATURE: 98.3 F | SYSTOLIC BLOOD PRESSURE: 112 MMHG | HEART RATE: 78 BPM | DIASTOLIC BLOOD PRESSURE: 68 MMHG | OXYGEN SATURATION: 98 % | BODY MASS INDEX: 24.73 KG/M2 | HEIGHT: 63 IN | WEIGHT: 139.6 LBS

## 2019-11-13 DIAGNOSIS — D36.10 SCHWANNOMA: ICD-10-CM

## 2019-11-13 DIAGNOSIS — D36.10 SCHWANNOMA: Primary | ICD-10-CM

## 2019-11-13 PROCEDURE — G0463 HOSPITAL OUTPT CLINIC VISIT: HCPCS | Mod: ZF

## 2019-11-13 ASSESSMENT — MIFFLIN-ST. JEOR: SCORE: 1312.35

## 2019-11-13 NOTE — Clinical Note
11/13/2019       RE: Keri Ornelas  [address] [address]     Dear Colleague,    Thank you for referring your patient, eKri Ornelas, to the [facility]. Please see a copy of my visit note below.    No notes on file    Again, thank you for allowing me to participate in the care of your patient.      Sincerely,    Roney Lutz MD

## 2019-11-13 NOTE — PROGRESS NOTES
THORACIC SURGERY FOLLOW UP VISIT     Dear Umang Burns,  I saw Ms. Ornelas in follow-up today (last seen 9/11). 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      COMPLICATIONS  Cristiano's syndrome     INTERVAL STUDIES    CT Chest 11/13:         CT 7/17/19 (preop CT):      Physical examination  BMI 24  Left ptosis slightly improved but persistent.       PMH  Past Medical History:   Diagnosis Date     Acute postinfectious glomerulonephritis      Allergic rhinitis      Anxiety      Asthma      Brittle hair      Psoriasis      Schwannoma        PSH  Past Surgical History:   Procedure Laterality Date     ADENOIDECTOMY  2005     MYRINGOTOMY, INSERT TUBE, COMBINED       SEPTOPLASTY, TURBINOPLASTY, COMBINED  01/2019     THORACOSCOPIC WEDGE RESECTION LUNG Left 8/12/2019    Procedure: Left Video Assisted Thoracoscopic Resection Superior Sulcus Tumor;  Surgeon: Delmar Ramirez MD;  Location: [facility] OR        SUBJECTIVE   Ms. Ornelas is here today in follow up of her Cristiano's syndrome s/p left VATS upper posterior sulcus schwannoma resection. She is being seen by ophthalmology who plans to perform a blepharoplasty in December. She no longer is having left arm pain or numbness. She has no associated vision changes other than the ptosis. We discussed the results of her CT scan which is normal.      From a personal perspective, she comes to clinic by herself.     IMPRESSION   34 year old female 3 months after left VATS resection of apical Schwannoma.     PLAN  I reviewed the plan as follows:  1) Follow-up in February 2020 following blepharoplasty as planned by Ophthalmology   They had all their questions answered and were in agreement with the plan.  I appreciate the opportunity to participate in the care of your patient and will keep you  updated.  Sincerely,    Delmar Lutz MD

## 2019-12-04 ENCOUNTER — ANESTHESIA EVENT (OUTPATIENT)
Dept: SURGERY | Facility: AMBULATORY SURGERY CENTER | Age: 34
End: 2019-12-04

## 2019-12-05 ENCOUNTER — ANESTHESIA (OUTPATIENT)
Dept: SURGERY | Facility: AMBULATORY SURGERY CENTER | Age: 34
End: 2019-12-05

## 2019-12-05 ENCOUNTER — HOSPITAL ENCOUNTER (OUTPATIENT)
Facility: AMBULATORY SURGERY CENTER | Age: 34
End: 2019-12-05
Attending: OPHTHALMOLOGY
Payer: COMMERCIAL

## 2019-12-05 VITALS
HEIGHT: 63 IN | BODY MASS INDEX: 24.63 KG/M2 | RESPIRATION RATE: 18 BRPM | WEIGHT: 139 LBS | HEART RATE: 80 BPM | SYSTOLIC BLOOD PRESSURE: 110 MMHG | TEMPERATURE: 97.7 F | OXYGEN SATURATION: 96 % | DIASTOLIC BLOOD PRESSURE: 68 MMHG

## 2019-12-05 DIAGNOSIS — Z98.890 POST-OPERATIVE STATE: Primary | ICD-10-CM

## 2019-12-05 LAB
HCG UR QL: NEGATIVE
INTERNAL QC OK POCT: YES

## 2019-12-05 RX ORDER — PROPOFOL 10 MG/ML
INJECTION, EMULSION INTRAVENOUS CONTINUOUS PRN
Status: DISCONTINUED | OUTPATIENT
Start: 2019-12-05 | End: 2019-12-05

## 2019-12-05 RX ORDER — ACETAMINOPHEN 325 MG/1
975 TABLET ORAL ONCE
Status: COMPLETED | OUTPATIENT
Start: 2019-12-05 | End: 2019-12-05

## 2019-12-05 RX ORDER — ONDANSETRON 4 MG/1
4 TABLET, ORALLY DISINTEGRATING ORAL EVERY 30 MIN PRN
Status: DISCONTINUED | OUTPATIENT
Start: 2019-12-05 | End: 2019-12-06 | Stop reason: HOSPADM

## 2019-12-05 RX ORDER — ERYTHROMYCIN 5 MG/G
OINTMENT OPHTHALMIC PRN
Status: DISCONTINUED | OUTPATIENT
Start: 2019-12-05 | End: 2019-12-05 | Stop reason: HOSPADM

## 2019-12-05 RX ORDER — SODIUM CHLORIDE, SODIUM LACTATE, POTASSIUM CHLORIDE, CALCIUM CHLORIDE 600; 310; 30; 20 MG/100ML; MG/100ML; MG/100ML; MG/100ML
INJECTION, SOLUTION INTRAVENOUS CONTINUOUS
Status: DISCONTINUED | OUTPATIENT
Start: 2019-12-05 | End: 2019-12-06 | Stop reason: HOSPADM

## 2019-12-05 RX ORDER — ONDANSETRON 2 MG/ML
INJECTION INTRAMUSCULAR; INTRAVENOUS PRN
Status: DISCONTINUED | OUTPATIENT
Start: 2019-12-05 | End: 2019-12-05

## 2019-12-05 RX ORDER — HYDROMORPHONE HYDROCHLORIDE 1 MG/ML
.3-.5 INJECTION, SOLUTION INTRAMUSCULAR; INTRAVENOUS; SUBCUTANEOUS EVERY 10 MIN PRN
Status: DISCONTINUED | OUTPATIENT
Start: 2019-12-05 | End: 2019-12-06 | Stop reason: HOSPADM

## 2019-12-05 RX ORDER — OXYCODONE HYDROCHLORIDE 5 MG/1
5 TABLET ORAL EVERY 4 HOURS PRN
Status: DISCONTINUED | OUTPATIENT
Start: 2019-12-05 | End: 2019-12-06 | Stop reason: HOSPADM

## 2019-12-05 RX ORDER — PROPOFOL 10 MG/ML
INJECTION, EMULSION INTRAVENOUS PRN
Status: DISCONTINUED | OUTPATIENT
Start: 2019-12-05 | End: 2019-12-05

## 2019-12-05 RX ORDER — ONDANSETRON 2 MG/ML
4 INJECTION INTRAMUSCULAR; INTRAVENOUS EVERY 30 MIN PRN
Status: DISCONTINUED | OUTPATIENT
Start: 2019-12-05 | End: 2019-12-06 | Stop reason: HOSPADM

## 2019-12-05 RX ORDER — FENTANYL CITRATE 50 UG/ML
25-50 INJECTION, SOLUTION INTRAMUSCULAR; INTRAVENOUS
Status: DISCONTINUED | OUTPATIENT
Start: 2019-12-05 | End: 2019-12-06 | Stop reason: HOSPADM

## 2019-12-05 RX ORDER — NEOMYCIN SULFATE, POLYMYXIN B SULFATE AND DEXAMETHASONE 3.5; 10000; 1 MG/ML; [USP'U]/ML; MG/ML
SUSPENSION/ DROPS OPHTHALMIC
Qty: 1 BOTTLE | Refills: 0 | Status: SHIPPED | OUTPATIENT
Start: 2019-12-05

## 2019-12-05 RX ORDER — SODIUM CHLORIDE, SODIUM LACTATE, POTASSIUM CHLORIDE, CALCIUM CHLORIDE 600; 310; 30; 20 MG/100ML; MG/100ML; MG/100ML; MG/100ML
500 INJECTION, SOLUTION INTRAVENOUS CONTINUOUS
Status: DISCONTINUED | OUTPATIENT
Start: 2019-12-05 | End: 2019-12-06 | Stop reason: HOSPADM

## 2019-12-05 RX ORDER — LIDOCAINE HYDROCHLORIDE 20 MG/ML
INJECTION, SOLUTION INFILTRATION; PERINEURAL PRN
Status: DISCONTINUED | OUTPATIENT
Start: 2019-12-05 | End: 2019-12-05

## 2019-12-05 RX ORDER — MEPERIDINE HYDROCHLORIDE 25 MG/ML
12.5 INJECTION INTRAMUSCULAR; INTRAVENOUS; SUBCUTANEOUS
Status: DISCONTINUED | OUTPATIENT
Start: 2019-12-05 | End: 2019-12-06 | Stop reason: HOSPADM

## 2019-12-05 RX ORDER — NALOXONE HYDROCHLORIDE 0.4 MG/ML
.1-.4 INJECTION, SOLUTION INTRAMUSCULAR; INTRAVENOUS; SUBCUTANEOUS
Status: DISCONTINUED | OUTPATIENT
Start: 2019-12-05 | End: 2019-12-06 | Stop reason: HOSPADM

## 2019-12-05 RX ORDER — TETRACAINE HYDROCHLORIDE 5 MG/ML
SOLUTION OPHTHALMIC PRN
Status: DISCONTINUED | OUTPATIENT
Start: 2019-12-05 | End: 2019-12-05 | Stop reason: HOSPADM

## 2019-12-05 RX ADMIN — ONDANSETRON 4 MG: 2 INJECTION INTRAMUSCULAR; INTRAVENOUS at 07:23

## 2019-12-05 RX ADMIN — SODIUM CHLORIDE, SODIUM LACTATE, POTASSIUM CHLORIDE, CALCIUM CHLORIDE 500 ML: 600; 310; 30; 20 INJECTION, SOLUTION INTRAVENOUS at 06:46

## 2019-12-05 RX ADMIN — SODIUM CHLORIDE, SODIUM LACTATE, POTASSIUM CHLORIDE, CALCIUM CHLORIDE: 600; 310; 30; 20 INJECTION, SOLUTION INTRAVENOUS at 07:13

## 2019-12-05 RX ADMIN — PROPOFOL 50 MG: 10 INJECTION, EMULSION INTRAVENOUS at 07:19

## 2019-12-05 RX ADMIN — ACETAMINOPHEN 975 MG: 325 TABLET ORAL at 06:34

## 2019-12-05 RX ADMIN — PROPOFOL 20 MG: 10 INJECTION, EMULSION INTRAVENOUS at 07:21

## 2019-12-05 RX ADMIN — LIDOCAINE HYDROCHLORIDE 25 MG: 20 INJECTION, SOLUTION INFILTRATION; PERINEURAL at 07:30

## 2019-12-05 RX ADMIN — PROPOFOL 100 MCG/KG/MIN: 10 INJECTION, EMULSION INTRAVENOUS at 07:21

## 2019-12-05 ASSESSMENT — LIFESTYLE VARIABLES: TOBACCO_USE: 0

## 2019-12-05 ASSESSMENT — MIFFLIN-ST. JEOR: SCORE: 1309.63

## 2019-12-05 ASSESSMENT — ENCOUNTER SYMPTOMS: SEIZURES: 0

## 2019-12-05 NOTE — OP NOTE
PREOPERATIVE DIAGNOSIS: Left upper eyelid ptosis.   POSTOPERATIVE DIAGNOSIS:  Left upper eyelid ptosis.   PROCEDURE:Left upper eyelid ptosis repair by Chopra's muscle conjunctival resection.   SURGEON: Jimmy Albrecht MD  ASSISTANT: Adelaida Fabian MD   ANESTHESIA: Monitored with local infiltration of a 50/50 mixture of 2% lidocaine with epinephrine and 0.5% Marcaine.   COMPLICATIONS: None.   ESTIMATED BLOOD LOSS: 1 mL   HISTORY: Keri Ornelas presented with upper lid ptosis interfering with the superior visual field and activities of daily living. After the risks, benefits and alternatives to the proposed procedure were explained, informed consent was obtained.   PROCEDURE: The patient was brought to the operating room and placed supine on the operating table. IV sedation was given. The left upper eyelid was infiltrated with local anesthetic and  was prepped and draped in the typical sterile ophthalmic fashion. Atttention was directed to the left  side.  A 4-0 silk suture was placed through the eyelid margin and the eyelid everted over a Desmarres retractor. A 6-0 silk suture was then threaded through the conjunctiva and Chopra's muscle 4.25 mm from the superior tarsal border in order to excise a total of 8.5 mm of muscle and conjunctiva. These sutures were used to elevate the conjunctiva and Chopra's muscle which was gently peeled from the underlying levator muscle. The Putterman clamp was used and clamped over the elevated tissues. A 6-0 plain gut suture was run in a horizontal mattress fashion 1 mm below the clamp from lateral to medial, then medial to lateral. The elevated tissues were excised with a 15 blade. The sutures were then externalized and tied in the lid crease. The 4-0 silk suture was removed. The lid was reverted to its normal position and ophthalmic antibiotic ointment placed in the eye.   The patient tolerated the procedure well and left the operating room in stable condition.   Jimmy Albrecht  MD

## 2019-12-05 NOTE — DISCHARGE INSTRUCTIONS
Post-operative Instructions  Ophthalmic Plastic and Reconstructive Surgery    Jimmy Albrecht M.D.     All instructions apply to the operated eye(s) or eyelid(s).    Wound care and personal care  ? Apply ice compresses 15 minutes of every hour while awake for 2 days. As long as there is no further bleeding, after two days, switch to warm water compresses for five minutes, four times a day until seen by your physician. Instead of warm water compresses, you can use a cup of dry uncooked rice in a clean cotton sock. Place sock in microwave for 30 seconds to one minute. Next place the warm sock in a plastic bag, and place it over a clean warm wet washcloth over operated eye.    ? You may shower or wash your hair the day after surgery. Do not go swimming for at least 2 weeks to prevent contamination of your wounds.  ? Do not apply make-up to the eyes or eyelids for 2 weeks after surgery.  ? Expect some swelling, bruising, black eye (even into the lower eyelids and cheeks). Also expect serum caking, crusting and discharge from the eye and/or incisions. A small amount of surface bleeding, and depending on the type of surgery, bleeding from the inside of the eyelid, is normal for the first 48 hours.  ? Avoid straining, bending at the waist, or lifting more than 15 pounds for 10 days. Activities that raise your blood pressure can worsen swelling, cause bleeding, and breaking of sutures. Like wise, sleeping with your head slightly elevated for the first several days can help swelling resolve more quickly.   ? Do continue to ambulate (walk) as you normally would - being sedentary after surgery can cause blood clots.   ? Your eye(s) and eyelid(s) may be painful and tender. This is normal after surgery.      Contact information and follow-up  ? Return to the Eye Clinic for a follow-up appointment with your physician as scheduled. If no appointment has been scheduled:   - [facility] eye clinic: [phone] for an  appointment with Dr. Albrecht within 1 to 2 weeks from your date of surgery.   -  [facility] eye clinic: [phone] for an appointment with Dr. Albrecht within 1 to 2 weeks from your date of surgery.     ? For severe pain, bleeding, or loss of vision, call the [facility] at [phone] or [facility] at [phone].     After hours or on weekends and holidays, call [phone] and ask to speak with the ophthalmologist on call.    An on call person can be reached after hours for concerns. The on call doctor should not call in medication refill requests after hours or on weekends, so please plan accordingly. An effort has been made to provide adequate pain medications following every surgery, and refills will not be provided in most instances. Narcotic pain medications cannot be called in.     Activity restrictions and driving  ? Avoid heavy lifting, bending, exercise or strenuous activity for 1 week after surgery.  You may resume other activities and return to work as tolerated.  ? You may not resume driving if you are using narcotic pain medications (such as Norco, Percocet, Tylenol #3).    Medications  ? Restart all regular home medications and eye drops. If you take Plavix or Aspirin on a regular basis, wait for 72 hours after your surgery before restarting these in order to decrease the risk of bleeding complications.  ? Avoid aspirin and aspirin-like medications (Motrin, Aleve, Ibuprofen, [place] etc) for 72 hours to reduce the risk of bleeding. You may take Tylenol (acetaminophen) for pain.  ? In addition to your home medications, take the following post-operative medications as prescribed by your physician.    ? Instill eye drops 3 times a day for 10 days.   ? In many cases, postoperative discomfort can be managed with Tylenol alone. If narcotic pain medication was prescribed, take pain pills at prescribed frequency as needed for pain.    ?  WARNING: Most prescription pain medications contain Tylenol  (acetaminophen). You must not take more than 4,000 mg of acetaminophen per  24-hour period. This is equivalent to 6 tablets of Darvocet, 12 tablets of Norco, Percocet or Tylenol #3. If you take other over-the-counter medications containing acetaminophen, you must take the amount of acetaminophen into account and reduce the number of prescribed pain pills accordingly.  ? Prescribed narcotic medications may make you drowsy. You must not drive a car, operate heavy machinery or drink alcohol while taking them.  ? Prescribed narcotic pain medications may cause constipation and nausea. Take them with some food to prevent a stomach upset.      [facility]  Home Care Following Anesthesia  For 24 hours after surgery:  1. Get plenty of rest.  A responsible adult must stay with you for at least 24 hours after you leave the surgery center.  2. Do not drive or use heavy equipment.  If you have weakness or tingling, don't drive or use heavy equipment until this feeling goes away.   3. Do not drink alcohol.   4. Avoid strenuous or risky activities.  Ask for help when climbing stairs.  5. You may feel lightheaded.  IF so, sit for a few minutes before standing.  Have someone help you get up.   6. If you have nausea (feel sick to your stomach): Drink only clear liquids such as apple juice, ginger ale, broth or 7-Up.  Rest may also help.  Be sure to drink enough fluids.  Move to a regular diet as you feel able.   7. You may have a slight fever.  Call the doctor if your fever is over 100 F (37.7 C) (taken under the tongue) or lasts longer than 24 hours.  8. You may have a dry mouth, a sore throat, muscle aches or trouble sleeping. These should go away after 24 hours.  9. Do not make important or legal decisions.        Today you received a Marcaine or bupivacaine block to numb the nerves near your surgery site.  This is a block using local  "anesthetic or \"numbing\" medication injected around the nerves to anesthetize or \"numb\" the area supplied by those nerves.  This block is injected into the muscle layer near your surgical site.  The medication may numb the location where you had surgery for 6-18 hours, but may last up to 24 hours.  If your surgical site is an arm or leg you should be careful with your affected limb, since it is possible to injure your limb without being aware of it due to the numbing.  Until full feeling returns, you should guard against bumping or hitting your limb, and avoid extreme hot or cold temperatures on the skin.  As the block wears off, the feeling will return as a tingling or prickly sensation near your surgical site.  You will experience more discomfort from your incision as the feeling returns.  You may want to take a pain pill (a narcotic or Tylenol if this was prescribed by your surgeon) when you start to experience mild pain before the pain beccomes more severe.  If your pain medications do not control your pain you should notifiy your surgeon.    Tips for taking pain medications  To get the best pain relief possible, remember these points:    Take pain medications as directed, before pain becomes severe.    Pain medication can upset your stomach: taking it with food may help.    Constipation is a common side effect of pain medication. Drink plenty of  fluids.    Eat foods high in fiber. Take a stool softener if recommended by your doctor or pharmacist.    Do not drink alcohol, drive or operate machinery while taking pain medications.    Ask about other ways to control pain, such as with heat, ice or relaxation.    Tylenol/Acetaminophen Consumption  To help encourage the safe use of acetaminophen, the makers of TYLENOL  have lowered the maximum daily dose for single-ingredient Extra Strength TYLENOL  (acetaminophen) products sold in the [place] from 8 pills per day (4,000 mg) to 6 pills per day (3,000 mg). The dosing " interval has also changed from 2 pills every 4-6 hours to 2 pills every 6 hours.    If you feel your pain relief is insufficient, you may take Tylenol/Acetaminophen in addition to your narcotic pain medication.     Be careful not to exceed 3,000 mg of Tylenol/Acetaminophen in a 24 hour period from all sources.    If you are taking extra strength Tylenol/acetaminophen (500 mg), the maximum dose is 6 tablets in 24 hours.    If you are taking regular strength acetaminophen (325 mg), the maximum dose is 9 tablets in 24 hours.    ***975mg of Tylenol at 06:30am***    Call a doctor for any of the followin. Signs of infection (fever, growing tenderness at the surgery site, a large amount of drainage or bleeding, severe pain, foul-smelling drainage, redness, swelling).  2. It has been over 8 to 10 hours since surgery and you are still not able to urinate (pass water).  3. Headache for over 24 hours.    Your doctor is:       Dr. Jimmy Albrecht, Ophthalmology: [phone]               Or dial [phone] and ask for the resident on call for:  Ophthalmology  For emergency care, call the:  [place]:  [phone] (TTY for hearing impaired: [phone])

## 2019-12-05 NOTE — ANESTHESIA PREPROCEDURE EVALUATION
Anesthesia Pre-Procedure Evaluation    Patient: Keri Ornelas   MRN:     [id number] Gender:   female   Age:    34 year old :      1985        Preoperative Diagnosis: Ptosis   Procedure(s):  Left Upper Hansen's Muscle Resection Ptosis Repair     Past Medical History:   Diagnosis Date     Acute postinfectious glomerulonephritis      Allergic rhinitis      Anxiety      Asthma      Brittle hair      Psoriasis      Schwannoma       Past Surgical History:   Procedure Laterality Date     ADENOIDECTOMY  2005     MYRINGOTOMY, INSERT TUBE, COMBINED       SEPTOPLASTY, TURBINOPLASTY, COMBINED  2019     THORACOSCOPIC WEDGE RESECTION LUNG Left 2019    Procedure: Left Video Assisted Thoracoscopic Resection Superior Sulcus Tumor;  Surgeon: Delmar Ramirez MD;  Location: [facility] OR          Anesthesia Evaluation     . Pt has had prior anesthetic. Type: General    No history of anesthetic complications          ROS/MED HX    ENT/Pulmonary:     (+)allergic rhinitis, Intermittent asthma , . Other pulmonary disease Left lung tumor.   (-) tobacco use   Neurologic:  - neg neurologic ROS    (-) seizures, CVA and migraines   Cardiovascular:  - neg cardiovascular ROS   (+) ----. : . . . :. . Previous cardiac testing date:results:date: results:ECG reviewed date:16 results: date: results:          METS/Exercise Tolerance: Comment: Patient swims, paddleboards and walks dog >4 METS   Hematologic:        (-) history of blood clots, anemia and History of Transfusion   Musculoskeletal:  - neg musculoskeletal ROS       GI/Hepatic:  - neg GI/hepatic ROS      (-) GERD   Renal/Genitourinary: Comment: History of post infectious glomerulonephritis in      (-) renal disease   Endo:  - neg endo ROS       Psychiatric:     (+) psychiatric history anxiety      Infectious Disease:  - neg infectious disease ROS       Malignancy:      - no malignancy   Other: Comment: Occasional psoriasis    (+) Possibly pregnant LMP: 21, no H/O  "Chronic Pain,no other significant disability                        PHYSICAL EXAM:   Mental Status/Neuro: A/A/O   Airway: Facies: Feasible  Mallampati: I  Mouth/Opening: Full  TM distance: > 6 cm  Neck ROM: Full   Respiratory: Auscultation: CTAB     Resp. Rate: Normal     Resp. Effort: Normal      CV: Rhythm: Regular  Rate: Age appropriate  Heart: Normal Sounds  Edema: None   Comments:      Dental: Normal Dentition                LABS:  CBC:   Lab Results   Component Value Date    WBC 5.9 08/14/2019    WBC 17.8 (H) 08/13/2019    HGB 11.8 08/14/2019    HGB 13.0 08/13/2019    HCT 37.3 08/14/2019    HCT 39.3 08/13/2019     08/14/2019     08/13/2019     BMP:   Lab Results   Component Value Date     (H) 08/14/2019     08/13/2019    POTASSIUM 3.8 08/14/2019    POTASSIUM 3.8 08/13/2019    CHLORIDE 109 08/14/2019    CHLORIDE 107 08/13/2019    CO2 28 08/14/2019    CO2 25 08/13/2019    BUN 17 08/14/2019    BUN 16 08/13/2019    CR 0.77 08/14/2019    CR 0.65 08/13/2019    GLC 91 08/14/2019     (H) 08/13/2019     COAGS: No results found for: PTT, INR, FIBR  POC:   Lab Results   Component Value Date    BGM 88 08/12/2019    HCG Negative 12/05/2019     OTHER:   Lab Results   Component Value Date    JANN 8.6 08/14/2019    MAG 1.7 08/13/2019        Preop Vitals    BP Readings from Last 3 Encounters:   12/05/19 106/63   11/13/19 112/68   09/11/19 (!) 135/92    Pulse Readings from Last 3 Encounters:   12/05/19 80   11/13/19 78   09/11/19 96      Resp Readings from Last 3 Encounters:   12/05/19 12   09/11/19 16   08/14/19 16    SpO2 Readings from Last 3 Encounters:   12/05/19 96%   11/13/19 98%   09/11/19 99%      Temp Readings from Last 1 Encounters:   12/05/19 36.4  C (97.5  F) (Temporal)    Ht Readings from Last 1 Encounters:   12/05/19 1.6 m (5' 3\")      Wt Readings from Last 1 Encounters:   12/05/19 63 kg (139 lb)    Estimated body mass index is 24.62 kg/m  as calculated from the following:    Height " "as of this encounter: 1.6 m (5' 3\").    Weight as of this encounter: 63 kg (139 lb).     LDA:  Peripheral IV 12/05/19 Left Wrist (Active)   Site Assessment WDL 12/5/2019  7:36 AM   Line Status Infusing 12/5/2019  7:36 AM   Phlebitis Scale 0-->no symptoms 12/5/2019  7:36 AM   Infiltration Scale 0 12/5/2019  7:36 AM   Dressing Intervention New dressing  12/5/2019  6:45 AM   Number of days: 0        Assessment:   ASA SCORE: 2    H&P: History and physical reviewed and following examination; no interval change.   Smoking Status:  Non-Smoker/Unknown   NPO Status: NPO Appropriate     Plan:   Anes. Type:  MAC   Pre-Medication: None   Induction:  IV (Standard)   Airway: Native Airway   Access/Monitoring: PIV   Maintenance: Propofol Sedation     Postop Plan:   Postop Pain: None  Postop Sedation/Airway: Not planned  Disposition: Outpatient     PONV Management:   Adult Risk Factors: Female, Non-Smoker   Prevention:, Propofol     CONSENT: Direct conversation   Plan and risks discussed with: Patient   Blood Products: Consent Deferred (Minimal Blood Loss)                   Jordan Rdz MD  Staff Anesthesiologist  *[phone]    "

## 2019-12-05 NOTE — ANESTHESIA POSTPROCEDURE EVALUATION
Anesthesia POST Procedure Evaluation    Patient: Keri Ornelas   MRN:     [id number] Gender:   female   Age:    34 year old :      1985        Preoperative Diagnosis: Ptosis   Procedure(s):  Left Upper Hansen's Muscle Resection Ptosis Repair   Postop Comments: No value filed.       Anesthesia Type:  Not documented  No value filed.    Reportable Event: NO     PAIN: Uncomplicated   Sign Out status: Comfortable, Well controlled pain     PONV: No PONV   Sign Out status:  No Nausea or Vomiting     Neuro/Psych: Uneventful perioperative course   Sign Out Status: Preoperative baseline; Age appropriate mentation     Airway/Resp.: Uneventful perioperative course   Sign Out Status: Non labored breathing, age appropriate RR; Resp. Status within EXPECTED Parameters     CV: Uneventful perioperative course   Sign Out status: Appropriate BP and perfusion indices; Appropriate HR/Rhythm     Disposition:   Sign Out in:  PACU  Disposition:  Phase II; Home  Recovery Course: Uneventful  Follow-Up: Not required           Last Anesthesia Record Vitals:  CRNA VITALS  2019 0706 - 2019 0806      2019             Resp Rate (set):  10          Last PACU Vitals:  Vitals Value Taken Time   /63 2019  7:36 AM   Temp 36.4  C (97.5  F) 2019  7:36 AM   Pulse     Resp 12 2019  7:36 AM   SpO2 96 % 2019  7:36 AM   Temp src     NIBP 102/61 2019  7:31 AM   Pulse 83 2019  7:34 AM   SpO2 97 % 2019  7:34 AM   Resp     Temp     Ht Rate 84 2019  7:34 AM   Temp 2           Electronically Signed By: Jordan Rdz MD, 2019, 8:45 AM

## 2019-12-05 NOTE — BRIEF OP NOTE
[facility] Brief Operative Note    Pre-operative diagnosis: Ptosis   Post-operative diagnosis Ptosis   Procedure: Procedure(s):  Left Upper Hansen's Muscle Resection Ptosis Repair   Surgeon(s): Surgeon(s) and Role:     * Jimmy Albrecht MD - Primary     * Adelaida Fabian MD - Resident - Assisting   Estimated blood loss: * No values recorded between 12/5/2019  7:26 AM and 12/5/2019  7:36 AM *    Specimens: * No specimens in log *   Findings: None

## 2019-12-10 ENCOUNTER — DOCUMENTATION ONLY (OUTPATIENT)
Dept: CARE COORDINATION | Facility: CLINIC | Age: 34
End: 2019-12-10

## 2019-12-17 ENCOUNTER — OFFICE VISIT (OUTPATIENT)
Dept: OPHTHALMOLOGY | Facility: CLINIC | Age: 34
End: 2019-12-17
Payer: COMMERCIAL

## 2019-12-17 DIAGNOSIS — Z98.890 POSTOPERATIVE EYE STATE: Primary | ICD-10-CM

## 2019-12-17 PROBLEM — M79.602 LEFT ARM PAIN: Status: ACTIVE | Noted: 2019-05-24

## 2019-12-17 PROBLEM — J31.0 CHRONIC RHINITIS: Status: ACTIVE | Noted: 2018-10-02

## 2019-12-17 PROBLEM — N05.9 POST-INFECTIOUS GLOMERULONEPHRITIS: Status: ACTIVE | Noted: 2019-12-17

## 2019-12-17 PROBLEM — R53.83 FATIGUE: Status: ACTIVE | Noted: 2019-03-25

## 2019-12-17 PROBLEM — G44.89 SLUDER'S NEURALGIA: Status: ACTIVE | Noted: 2018-10-02

## 2019-12-17 PROBLEM — J34.2 DEVIATED NASAL SEPTUM: Status: ACTIVE | Noted: 2018-10-02

## 2019-12-17 PROBLEM — R93.7 ABNORMAL MRI, THORACIC SPINE: Status: ACTIVE | Noted: 2019-05-24

## 2019-12-17 PROBLEM — J30.2 SEASONAL ALLERGIES: Status: ACTIVE | Noted: 2019-12-17

## 2019-12-17 ASSESSMENT — SLIT LAMP EXAM - LIDS
COMMENTS: MILD LID EDEMA
COMMENTS: NORMAL

## 2019-12-17 ASSESSMENT — VISUAL ACUITY
METHOD: SNELLEN - LINEAR
OD_SC+: -1
OD_SC: 20/25
OS_SC: 20/20

## 2019-12-17 ASSESSMENT — TONOMETRY
OS_IOP_MMHG: 20
OD_IOP_MMHG: 20
IOP_METHOD: ICARE

## 2019-12-17 NOTE — PROGRESS NOTES
Chief Complaints and History of Present Illnesses   Patient presents with     Post Op (Ophthalmology) Left Eye     POD#12 s/p Left upper eyelid ptosis repair by Chopra's muscle conjunctival resection          HPI     Post Op (Ophthalmology) Left Eye     In left eye.  Associated symptoms include Negative for discharge, swelling, eye pain and burning.  Response to treatment was no improvement. Additional comments: POD#12 s/p Left upper eyelid ptosis repair by Chopra's muscle conjunctival resection              Comments     Patient notes that it looks a little smaller than the RE, feels some sutures a few days ago, hasn't felt them in the last 2 days, wondering if they are dissolved.     Carmina Goins COT December 17, 2019 7:34 AM            Last edited by Elizabeth Goins on 12/17/2019  7:34 AM. (History)          Keri Ornelas is about 2 weeks status post left mmcr ptosis repair.  She is doing well.  Stopped using drops two days ago  Reports slight asymmetry  Discussed this this will likely improve as swelling resolves   Continue to monitor for now     return to clinic: 2 months for evaluation v/t          Raheem Champion MD  Ophthalmology Resident, PGY-4  [facility]  Attending Physician Attestation:  Complete documentation of historical and exam elements from today's encounter can be found in the full encounter summary report (not reduplicated in this progress note).  I personally obtained the chief complaint(s) and history of present illness.  I confirmed and edited as necessary the review of systems, past medical/surgical history, family history, social history, and examination findings as documented by others; and I examined the patient myself.  I personally reviewed the relevant tests, images, and reports as documented above.  I formulated and edited as necessary the assessment and plan and discussed the findings and management plan with the patient and family. I personally reviewed the ophthalmic  test(s) associated with this encounter, agree with the interpretation(s) as documented by the resident/fellow, and have edited the corresponding report(s) as necessary.   -Jimmy Albrecht MD

## 2019-12-17 NOTE — NURSING NOTE
Chief Complaints and History of Present Illnesses   Patient presents with     Post Op (Ophthalmology) Left Eye     POD#12 s/p Left upper eyelid ptosis repair by Chopra's muscle conjunctival resection     Chief Complaint(s) and History of Present Illness(es)     Post Op (Ophthalmology) Left Eye     Laterality: left eye    Associated symptoms: Negative for discharge, swelling, eye pain and burning    Response to treatment: no improvement    Comments: POD#12 s/p Left upper eyelid ptosis repair by Chopra's muscle conjunctival resection              Comments     Patient notes that it looks a little smaller than the RE, feels some sutures a few days ago, hasn't felt them in the last 2 days, wondering if they are dissolved.     Carmina PALOMINO December 17, 2019 7:34 AM

## 2020-02-19 ENCOUNTER — OFFICE VISIT (OUTPATIENT)
Dept: SURGERY | Facility: CLINIC | Age: 35
End: 2020-02-19
Attending: THORACIC SURGERY (CARDIOTHORACIC VASCULAR SURGERY)
Payer: COMMERCIAL

## 2020-02-19 VITALS
TEMPERATURE: 98.3 F | HEART RATE: 65 BPM | DIASTOLIC BLOOD PRESSURE: 77 MMHG | RESPIRATION RATE: 14 BRPM | WEIGHT: 139.7 LBS | BODY MASS INDEX: 24.75 KG/M2 | HEIGHT: 63 IN | OXYGEN SATURATION: 98 % | SYSTOLIC BLOOD PRESSURE: 113 MMHG

## 2020-02-19 DIAGNOSIS — D36.10 SCHWANNOMA: Primary | ICD-10-CM

## 2020-02-19 PROCEDURE — G0463 HOSPITAL OUTPT CLINIC VISIT: HCPCS | Mod: ZF

## 2020-02-19 RX ORDER — PAROXETINE 20 MG/1
TABLET, FILM COATED ORAL
COMMUNITY
Start: 2020-02-15

## 2020-02-19 RX ORDER — MONTELUKAST SODIUM 10 MG/1
TABLET ORAL
COMMUNITY
Start: 2020-01-29

## 2020-02-19 ASSESSMENT — MIFFLIN-ST. JEOR: SCORE: 1312.68

## 2020-02-19 ASSESSMENT — PAIN SCALES - GENERAL: PAINLEVEL: NO PAIN (0)

## 2020-02-19 NOTE — PROGRESS NOTES
THORACIC SURGERY FOLLOW UP VISIT     Dear Umang Burns,  SHELLIE saw Ms. Ornelas in follow-up today (last seen 11/13). 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 Unequal pupils.       PMH  Past Medical History:   Diagnosis Date     Acute postinfectious glomerulonephritis      Allergic rhinitis      Anxiety      Asthma      Brittle hair      Psoriasis      Schwannoma        PSH  Past Surgical History:   Procedure Laterality Date     ADENOIDECTOMY  2005     MYRINGOTOMY, INSERT TUBE, COMBINED       REPAIR PTOSIS Left 12/5/2019    Procedure: Left Upper Hansen's Muscle Resection Ptosis Repair;  Surgeon: Jimmy Albrecht MD;  Location: [facility] OR     SEPTOPLASTY, TURBINOPLASTY, COMBINED  01/2019     THORACOSCOPIC WEDGE RESECTION LUNG Left 8/12/2019    Procedure: Left Video Assisted Thoracoscopic Resection Superior Sulcus Tumor;  Surgeon: Delmar Ramirez MD;  Location: [facility] OR        SUBJECTIVE   Ms. Ornelas is here today to follow up s/p left VATS upper posterior sulcus schwannoma resection c/b Cristiano syndrome. She was seen by ophthalmology and underwent a  blepharoplasty in December that went well. She is much happier with the result now. She has noticed tingling sensation over her left anterior chest that is intermittent, which is new over the last couple of months. Otherwise she is doing fine.      From a personal perspective, she comes to clinic by herself.     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      PLAN  I  reviewed the plan as follows:  1) Follow up in May with repeat chest CT scan.   They had all their questions answered and were in agreement with the plan.  I appreciate the opportunity to participate in the care of your patient and will keep you updated.  Sincerely,    Delmar Lutz MD

## 2020-03-11 ENCOUNTER — HEALTH MAINTENANCE LETTER (OUTPATIENT)
Age: 35
End: 2020-03-11

## 2020-05-20 ENCOUNTER — VIRTUAL VISIT (OUTPATIENT)
Dept: SURGERY | Facility: CLINIC | Age: 35
End: 2020-05-20
Attending: THORACIC SURGERY (CARDIOTHORACIC VASCULAR SURGERY)
Payer: COMMERCIAL

## 2020-05-20 ENCOUNTER — ANCILLARY PROCEDURE (OUTPATIENT)
Dept: CT IMAGING | Facility: CLINIC | Age: 35
End: 2020-05-20
Attending: CLINICAL NURSE SPECIALIST
Payer: COMMERCIAL

## 2020-05-20 VITALS — WEIGHT: 131 LBS | BODY MASS INDEX: 22.36 KG/M2 | HEIGHT: 64 IN

## 2020-05-20 DIAGNOSIS — D36.10 SCHWANNOMA: ICD-10-CM

## 2020-05-20 DIAGNOSIS — D36.10 SCHWANNOMA: Primary | ICD-10-CM

## 2020-05-20 PROCEDURE — 40000114 ZZH STATISTIC NO CHARGE CLINIC VISIT

## 2020-05-20 ASSESSMENT — PAIN SCALES - GENERAL: PAINLEVEL: NO PAIN (0)

## 2020-05-20 ASSESSMENT — MIFFLIN-ST. JEOR: SCORE: 1289.21

## 2020-05-20 NOTE — PROGRESS NOTES
"Keri Ornelas is a 34 year old female who is being evaluated via a billable telephone visit.      The patient has been notified of following:     \"This telephone visit will be conducted via a call between you and your physician/provider. We have found that certain health care needs can be provided without the need for a physical exam.  This service lets us provide the care you need with a short phone conversation.  If a prescription is necessary we can send it directly to your pharmacy.  If lab work is needed we can place an order for that and you can then stop by our lab to have the test done at a later time.    Telephone visits are billed at different rates depending on your insurance coverage. During this emergency period, for some insurers they may be billed the same as an in-person visit.  Please reach out to your insurance provider with any questions.    If during the course of the call the physician/provider feels a telephone visit is not appropriate, you will not be charged for this service.\"    Patient has given verbal consent for Telephone visit?  Yes    What phone number would you like to be contacted at? [phone]     How would you like to obtain your AVS? MyChart     I have reviewed and updated the patient's allergies and medication list.    Concerns: No new concerns  Refills: No refills needed.        Bozena Harley, [facility]    THORACIC SURGERY FOLLOW UP VISIT    I saw Ms. Ornelas in follow-up today. 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           ETOH None   TOB None   BMI 22    Physical exam: Deferred.     SUBJECTIVE   Keri is " doing great. She has minimal pain and occasional paresthesias. She is satisfied with the cosmetic result from her blepharoplasty.     From a personal perspective, she and her family are well during this pandemic  .     IMPRESSION (D36.10) Schwannoma  (primary encounter diagnosis)    34 year old female with left apical benign Schwannoma, 8 months after resection. No evidence of disease.     PLAN   I reviewed the plan as follows:  No further imaging necessary unless any concerns.   F/u with thoracic surgery PRN.   All questions were answered and the patient and present family were in agreement with the plan.  I appreciate the opportunity to participate in the care of your patient and will keep you updated.  Sincerely,  Delmar Terrazas MD      Phone call duration: 15 minutes

## 2021-01-03 ENCOUNTER — HEALTH MAINTENANCE LETTER (OUTPATIENT)
Age: 36
End: 2021-01-03

## 2021-04-12 LAB
ABO GROUP BLD: NORMAL
D AG BLD QL: POSITIVE
EXTERNAL ANTIBODY SCREEN: NEGATIVE
EXTERNAL CHLAMYDIA TRACHOMATIS DNA PROBE: NEGATIVE
EXTERNAL HEPATITIS B SURFACE ANTIGEN: NONREACTIVE
EXTERNAL HIV 1+2 AB/P24 AG SERUM: NONREACTIVE
EXTERNAL NEISSERIA GONORRHOEAE DNA PROBE: NEGATIVE
EXTERNAL RUBELLA ANTIBODY, IGG: NORMAL
T PALLIDUM AB SER QL IA: NORMAL

## 2021-04-16 LAB — EXTERNAL SYPHILIS RPR SCREEN: NORMAL

## 2021-04-25 ENCOUNTER — HEALTH MAINTENANCE LETTER (OUTPATIENT)
Age: 36
End: 2021-04-25

## 2021-08-28 ENCOUNTER — APPOINTMENT (OUTPATIENT)
Dept: ULTRASOUND IMAGING | Facility: HOSPITAL | Age: 36
DRG: 831 | End: 2021-08-28
Payer: COMMERCIAL

## 2021-08-28 ENCOUNTER — HOSPITAL ENCOUNTER (INPATIENT)
Facility: HOSPITAL | Age: 36
LOS: 5 days | Discharge: HOME | DRG: 831 | End: 2021-09-02
Attending: OBSTETRICS & GYNECOLOGY | Admitting: OBSTETRICS & GYNECOLOGY
Payer: COMMERCIAL

## 2021-08-28 DIAGNOSIS — O60.03 PRETERM LABOR IN THIRD TRIMESTER WITHOUT DELIVERY: Primary | ICD-10-CM

## 2021-08-28 LAB
ABO GROUP (TYPE) IN BLOOD: NORMAL
ALBUMIN SERPL-MCNC: 3.5 G/DL (ref 3.5–5.3)
ALP SERPL-CCNC: 90 U/L (ref 37–98)
ALT SERPL-CCNC: 18 U/L (ref 7–52)
ANION GAP SERPL CALC-SCNC: 9 MMOL/L (ref 3–11)
ANTIBODY SCREEN: NORMAL
AST SERPL-CCNC: 16 U/L
BASOPHILS # BLD AUTO: 0.1 10*3/UL
BASOPHILS NFR BLD AUTO: 1 % (ref 0–2)
BILIRUB SERPL-MCNC: 0.39 MG/DL (ref 0.2–1.4)
BUN SERPL-MCNC: 15 MG/DL (ref 7–25)
CALCIUM ALBUM COR SERPL-MCNC: 9.3 MG/DL (ref 8.6–10.3)
CALCIUM SERPL-MCNC: 8.9 MG/DL (ref 8.6–10.3)
CHLORIDE SERPL-SCNC: 105 MMOL/L (ref 98–107)
CO2 SERPL-SCNC: 22 MMOL/L (ref 21–32)
CREAT SERPL-MCNC: 0.52 MG/DL (ref 0.6–1.1)
D AG BLD QL: NORMAL
EOSINOPHIL # BLD AUTO: 0.2 10*3/UL
EOSINOPHIL NFR BLD AUTO: 2 % (ref 0–3)
ERYTHROCYTE [DISTWIDTH] IN BLOOD BY AUTOMATED COUNT: 11.6 % (ref 11.5–14)
GFR SERPL CREATININE-BSD FRML MDRD: 126 ML/MIN/1.73M*2
GLUCOSE SERPL-MCNC: 122 MG/DL (ref 70–105)
HCT VFR BLD AUTO: 34.6 % (ref 34–45)
HGB BLD-MCNC: 12.1 G/DL (ref 11.5–15.5)
LYMPHOCYTES # BLD AUTO: 1.6 10*3/UL
LYMPHOCYTES NFR BLD AUTO: 18 % (ref 11–47)
MCH RBC QN AUTO: 30.6 PG (ref 28–33)
MCHC RBC AUTO-ENTMCNC: 34.9 G/DL (ref 32–36)
MCV RBC AUTO: 87.8 FL (ref 81–97)
MONOCYTES # BLD AUTO: 0.6 10*3/UL
MONOCYTES NFR BLD AUTO: 6 % (ref 3–11)
NEUTROPHILS # BLD AUTO: 6.7 10*3/UL
NEUTROPHILS NFR BLD AUTO: 74 % (ref 41–81)
PLATELET # BLD AUTO: 267 10*3/UL (ref 140–350)
PMV BLD AUTO: 8.8 FL (ref 6.9–10.8)
POTASSIUM SERPL-SCNC: 3.6 MMOL/L (ref 3.5–5.1)
PROT SERPL-MCNC: 6.5 G/DL (ref 6–8.3)
RBC # BLD AUTO: 3.94 10*6/ΜL (ref 3.7–5.3)
SECOND/CONFIRMATORY ABORH PERFORMED: NORMAL
SODIUM SERPL-SCNC: 136 MMOL/L (ref 135–145)
WBC # BLD AUTO: 9.1 10*3/UL (ref 4.5–10.5)

## 2021-08-28 PROCEDURE — 86901 BLOOD TYPING SEROLOGIC RH(D): CPT

## 2021-08-28 PROCEDURE — 80053 COMPREHEN METABOLIC PANEL: CPT | Performed by: OBSTETRICS & GYNECOLOGY

## 2021-08-28 PROCEDURE — 6360000200 HC RX 636 W HCPCS (ALT 250 FOR IP): Performed by: OBSTETRICS & GYNECOLOGY

## 2021-08-28 PROCEDURE — (BLANK) HC ROOM PRIVATE OBSTETRICS

## 2021-08-28 PROCEDURE — 2580000300 HC RX 258: Performed by: OBSTETRICS & GYNECOLOGY

## 2021-08-28 PROCEDURE — 85025 COMPLETE CBC W/AUTO DIFF WBC: CPT | Performed by: OBSTETRICS & GYNECOLOGY

## 2021-08-28 PROCEDURE — 76815 OB US LIMITED FETUS(S): CPT

## 2021-08-28 RX ORDER — OMEPRAZOLE 20 MG/1
20 CAPSULE, DELAYED RELEASE ORAL DAILY
COMMUNITY

## 2021-08-28 RX ORDER — FOLIC ACID/MULTIVIT,IRON,MINER 0.4MG-18MG
1 TABLET ORAL DAILY
COMMUNITY

## 2021-08-28 RX ORDER — SODIUM CHLORIDE, SODIUM LACTATE, POTASSIUM CHLORIDE, CALCIUM CHLORIDE 600; 310; 30; 20 MG/100ML; MG/100ML; MG/100ML; MG/100ML
75 INJECTION, SOLUTION INTRAVENOUS CONTINUOUS
Status: DISCONTINUED | OUTPATIENT
Start: 2021-08-28 | End: 2021-09-02 | Stop reason: HOSPADM

## 2021-08-28 RX ORDER — SODIUM CHLORIDE, SODIUM LACTATE, POTASSIUM CHLORIDE, AND CALCIUM CHLORIDE .6; .31; .03; .02 G/100ML; G/100ML; G/100ML; G/100ML
250 INJECTION, SOLUTION INTRAVENOUS ONCE
Status: COMPLETED | OUTPATIENT
Start: 2021-08-28 | End: 2021-08-28

## 2021-08-28 RX ORDER — TERBUTALINE SULFATE 1 MG/ML
0.25 INJECTION SUBCUTANEOUS ONCE
Status: COMPLETED | OUTPATIENT
Start: 2021-08-28 | End: 2021-08-28

## 2021-08-28 RX ADMIN — SODIUM CHLORIDE, POTASSIUM CHLORIDE, SODIUM LACTATE AND CALCIUM CHLORIDE 250 ML: 600; 310; 30; 20 INJECTION, SOLUTION INTRAVENOUS at 22:15

## 2021-08-28 RX ADMIN — TERBUTALINE SULFATE 0.25 MG: 1 INJECTION SUBCUTANEOUS at 23:18

## 2021-08-28 RX ADMIN — SODIUM CHLORIDE, POTASSIUM CHLORIDE, SODIUM LACTATE AND CALCIUM CHLORIDE 125 ML/HR: 600; 310; 30; 20 INJECTION, SOLUTION INTRAVENOUS at 23:18

## 2021-08-28 ASSESSMENT — ACTIVITIES OF DAILY LIVING (ADL)
ADEQUATE_TO_COMPLETE_ADL: YES
PATIENT'S MEMORY ADEQUATE TO SAFELY COMPLETE DAILY ACTIVITIES?: YES

## 2021-08-29 PROBLEM — O45.93 PLACENTAL ABRUPTION IN THIRD TRIMESTER: Status: ACTIVE | Noted: 2021-08-29

## 2021-08-29 PROBLEM — Z3A.31 31 WEEKS GESTATION OF PREGNANCY: Status: ACTIVE | Noted: 2021-08-29

## 2021-08-29 LAB
BASOPHILS # BLD AUTO: 0.1 10*3/UL
BASOPHILS NFR BLD AUTO: 1 % (ref 0–2)
EOSINOPHIL # BLD AUTO: 0.1 10*3/UL
EOSINOPHIL NFR BLD AUTO: 1 % (ref 0–3)
ERYTHROCYTE [DISTWIDTH] IN BLOOD BY AUTOMATED COUNT: 11.8 % (ref 11.5–14)
HCT VFR BLD AUTO: 33.8 % (ref 34–45)
HGB BLD-MCNC: 11.8 G/DL (ref 11.5–15.5)
LYMPHOCYTES # BLD AUTO: 1.5 10*3/UL
LYMPHOCYTES NFR BLD AUTO: 15 % (ref 11–47)
MCH RBC QN AUTO: 30.9 PG (ref 28–33)
MCHC RBC AUTO-ENTMCNC: 34.9 G/DL (ref 32–36)
MCV RBC AUTO: 88.6 FL (ref 81–97)
MONOCYTES # BLD AUTO: 0.5 10*3/UL
MONOCYTES NFR BLD AUTO: 5 % (ref 3–11)
NEUTROPHILS # BLD AUTO: 8.2 10*3/UL
NEUTROPHILS NFR BLD AUTO: 79 % (ref 41–81)
PLATELET # BLD AUTO: 242 10*3/UL (ref 140–350)
PMV BLD AUTO: 8.3 FL (ref 6.9–10.8)
RBC # BLD AUTO: 3.81 10*6/ΜL (ref 3.7–5.3)
WBC # BLD AUTO: 10.4 10*3/UL (ref 4.5–10.5)

## 2021-08-29 PROCEDURE — 2580000300 HC RX 258: Performed by: OBSTETRICS & GYNECOLOGY

## 2021-08-29 PROCEDURE — (BLANK) HC ROOM PRIVATE OBSTETRICS

## 2021-08-29 PROCEDURE — 2590000100 HC RX 259: Performed by: OBSTETRICS & GYNECOLOGY

## 2021-08-29 PROCEDURE — 85025 COMPLETE CBC W/AUTO DIFF WBC: CPT | Performed by: OBSTETRICS & GYNECOLOGY

## 2021-08-29 PROCEDURE — 6370000100 HC RX 637 (ALT 250 FOR IP): Performed by: OBSTETRICS & GYNECOLOGY

## 2021-08-29 PROCEDURE — 6360000200 HC RX 636 W HCPCS (ALT 250 FOR IP): Performed by: OBSTETRICS & GYNECOLOGY

## 2021-08-29 RX ORDER — BETAMETHASONE SODIUM PHOSPHATE AND BETAMETHASONE ACETATE 3; 3 MG/ML; MG/ML
12 INJECTION, SUSPENSION INTRA-ARTICULAR; INTRALESIONAL; INTRAMUSCULAR; SOFT TISSUE ONCE
Status: COMPLETED | OUTPATIENT
Start: 2021-08-29 | End: 2021-08-29

## 2021-08-29 RX ORDER — FAMOTIDINE 20 MG/1
20 TABLET, FILM COATED ORAL 2 TIMES DAILY
Status: DISCONTINUED | OUTPATIENT
Start: 2021-08-29 | End: 2021-08-31

## 2021-08-29 RX ORDER — ACETAMINOPHEN 500 MG
500-1000 TABLET ORAL EVERY 6 HOURS PRN
Status: DISCONTINUED | OUTPATIENT
Start: 2021-08-29 | End: 2021-09-02 | Stop reason: HOSPADM

## 2021-08-29 RX ORDER — NIFEDIPINE 20 MG/1
20 CAPSULE ORAL ONCE
Status: COMPLETED | OUTPATIENT
Start: 2021-08-29 | End: 2021-08-29

## 2021-08-29 RX ORDER — ALUMINUM HYDROXIDE, MAGNESIUM HYDROXIDE, AND SIMETHICONE 1200; 120; 1200 MG/30ML; MG/30ML; MG/30ML
30 SUSPENSION ORAL ONCE
Status: COMPLETED | OUTPATIENT
Start: 2021-08-29 | End: 2021-08-29

## 2021-08-29 RX ORDER — BETAMETHASONE SODIUM PHOSPHATE AND BETAMETHASONE ACETATE 3; 3 MG/ML; MG/ML
12 INJECTION, SUSPENSION INTRA-ARTICULAR; INTRALESIONAL; INTRAMUSCULAR; SOFT TISSUE ONCE
Status: DISCONTINUED | OUTPATIENT
Start: 2021-08-30 | End: 2021-08-30

## 2021-08-29 RX ADMIN — ALUMINUM HYDROXIDE, MAGNESIUM HYDROXIDE, AND SIMETHICONE 30 ML: 200; 200; 20 SUSPENSION ORAL at 18:16

## 2021-08-29 RX ADMIN — FAMOTIDINE 20 MG: 20 TABLET ORAL at 20:02

## 2021-08-29 RX ADMIN — BETAMETHASONE SODIUM PHOSPHATE AND BETAMETHASONE ACETATE 12 MG: 3; 3 INJECTION, SUSPENSION INTRA-ARTICULAR; INTRALESIONAL; INTRAMUSCULAR at 15:14

## 2021-08-29 RX ADMIN — NIFEDIPINE 20 MG: 20 CAPSULE ORAL at 06:30

## 2021-08-29 RX ADMIN — Medication 1000 MG: at 12:56

## 2021-08-29 RX ADMIN — SODIUM CHLORIDE, POTASSIUM CHLORIDE, SODIUM LACTATE AND CALCIUM CHLORIDE 50 ML/HR: 600; 310; 30; 20 INJECTION, SOLUTION INTRAVENOUS at 16:14

## 2021-08-29 NOTE — PLAN OF CARE
Problem: Pain - Adult  Goal: Verbalizes/displays adequate comfort level or baseline comfort level  Description: INTERVENTIONS:  1. Encourage patient to monitor pain and request interventions  2. Assess pain using the appropriate pain scale  3. Administer analgesics based on type and severity of pain and evaluate response  4. Educate/Implement non-pharmacological measures as appropriate and evaluate response  5. Consider cultural, developmental and social influences on pain and pain management  6. Notify Provider if interventions unsuccessful or patient reports new pain  Outcome: Progressing  Flowsheets (Taken 8/28/2021 2350)  Verbalizes/displays adequate comfort level or baseline comfort level:   Encourage patient to monitor pain and request interventions   Assess pain using the appropriate pain scale   Administer analgesics based on type and severity of pain and evaluate response   Educate/Implement non-pharmacological measures as appropriate and evaluate response   Consider cultural, developmental and social influences on pain and pain management     Problem: Infection - Adult  Goal: Absence of infection during hospitalization  Description: INTERVENTIONS:  1. Assess and monitor for signs and symptoms of infection  2. Monitor lab/diagnostic results  3. Monitor all insertion sites/wounds/incisions  4. Monitor secretions for changes in amount and color  5. Administer medications as ordered  6. Educate and encourage patient and family to use good hand hygiene technique  7. Identify and educate in appropriate isolation precautions for identified infection/condition  Outcome: Progressing  Flowsheets (Taken 8/28/2021 2350)  Absence of infection during hospitalization:   Assess and monitor for signs and symptoms of infection   Monitor lab/diagnostic results   Monitor all insertion sites/wounds/incisions   Monitor secretions for changes in amount and colo   Administer medications as ordered   Educate and encourage  patient and family to use good hand hygiene technique     Problem: Safety Adult - Fall  Goal: Free from fall injury  Description: INTERVENTIONS:    Inpatient - Please reference Cares/Safety Flowsheet under Fair Fall Risk for interventions.  Pediatrics - Please reference Peds Daily Cares/Safety Flowsheet under [facility] Pediatric Fall Assessment Fall Bundle for interventions  LD/OB - Please reference OB Shift Screening Flowsheet under OB Fall Risk for interventions.  Outcome: Progressing  Note: Patient and spouse educated on fall precaution, upper side rails up, bed in lowest position, wheels locked, and call light within reach.     Problem: Discharge Planning  Goal: Discharge to home or other facility with appropriate resources  Description: INTERVENTIONS:  1. Identify and discuss barriers to discharge with patient and caregiver.  2. Arrange for needed discharge resources and transportation as appropriate.  3. Identify discharge learning needs (meds, wound care, etc).  4. Arrange for interpreters to assist at discharge as needed.  5. Refer to  for coordinating discharge planning if the patient needs post-hospital services based on physician order or complex needs related to functional status, cognitive ability or social support system.  Outcome: Progressing

## 2021-08-29 NOTE — NURSING END OF SHIFT
Nursing End of Shift Summary    Goals:  Clinical Goals for the Shift: maintain pregnancy    Narrative Summary of Progress Towards Clinical Goals:  Patient maintained pregnancy    Barriers for Transfer or Discharge: yes  SBAR given to KATELYN Smith to assume care

## 2021-08-29 NOTE — PLAN OF CARE
POC reviewed with pt.  Sarah Cameron RN    Problem: Pain - Adult  Goal: Verbalizes/displays adequate comfort level or baseline comfort level  Description: INTERVENTIONS:  1. Encourage patient to monitor pain and request interventions  2. Assess pain using the appropriate pain scale  3. Administer analgesics based on type and severity of pain and evaluate response  4. Educate/Implement non-pharmacological measures as appropriate and evaluate response  5. Consider cultural, developmental and social influences on pain and pain management  6. Notify Provider if interventions unsuccessful or patient reports new pain  Outcome: Progressing  Flowsheets (Taken 8/29/2021 0725)  Verbalizes/displays adequate comfort level or baseline comfort level:   Encourage patient to monitor pain and request interventions   Assess pain using the appropriate pain scale   Administer analgesics based on type and severity of pain and evaluate response   Educate/Implement non-pharmacological measures as appropriate and evaluate response   Consider cultural, developmental and social influences on pain and pain management   Notify Provider if interventions unsuccessful or patient reports new pain     Problem: Infection - Adult  Goal: Absence of infection during hospitalization  Description: INTERVENTIONS:  1. Assess and monitor for signs and symptoms of infection  2. Monitor lab/diagnostic results  3. Monitor all insertion sites/wounds/incisions  4. Monitor secretions for changes in amount and color  5. Administer medications as ordered  6. Educate and encourage patient and family to use good hand hygiene technique  7. Identify and educate in appropriate isolation precautions for identified infection/condition  Outcome: Progressing  Flowsheets (Taken 8/29/2021 0725)  Absence of infection during hospitalization:   Assess and monitor for signs and symptoms of infection   Monitor all insertion sites/wounds/incisions   Administer medications as  ordered   Monitor lab/diagnostic results   Monitor secretions for changes in amount and colo     Problem: Safety Adult - Fall  Goal: Free from fall injury  Description: INTERVENTIONS:    Inpatient - Please reference Cares/Safety Flowsheet under Fair Fall Risk for interventions.  Pediatrics - Please reference Peds Daily Cares/Safety Flowsheet under [facility] Pediatric Fall Assessment Fall Bundle for interventions  LD/OB - Please reference OB Shift Screening Flowsheet under OB Fall Risk for interventions.  Outcome: Progressing  Note: See OB shift screen flowsheet     Problem: Discharge Planning  Goal: Discharge to home or other facility with appropriate resources  Description: INTERVENTIONS:  1. Identify and discuss barriers to discharge with patient and caregiver.  2. Arrange for needed discharge resources and transportation as appropriate.  3. Identify discharge learning needs (meds, wound care, etc).  4. Arrange for interpreters to assist at discharge as needed.  5. Refer to  for coordinating discharge planning if the patient needs post-hospital services based on physician order or complex needs related to functional status, cognitive ability or social support system.  Outcome: Progressing  Flowsheets (Taken 8/29/2021 6515)  Discharge to home or other facility with appropriate resources:   Identify and discuss barriers to discharge with patient and caregiver   Identify discharge learning needs (meds, wound care, etc)

## 2021-08-29 NOTE — H&P
Ob History and Physical    Chief complaint: Vaginal bleeding.      HPI  Hien Mc is a 35 y.o. female with an Estimated Date of Delivery: 10/26/21. She is currently a 31w5d  who is being admitted for vaginal bleeding.  Patient was visiting area for a wedding and started having vaginal bleeding around 2200 hr. Contractions noted at time of delivery abated with IVF and Terbutaline.  . Her current pregnancy is significant for placenta previa. Ultrasound reveals posterior low lying placenta.  Patient reports vaginal bleeding has diminished. . Fetal Movement: normal. .     Mom Prenatal Results    Prenatal Results    1st Trimester     Test Value Reference Range Date Time    Hgb        Hct        Platelet Count        Urine culture        Gonorrhea * ^ Negative   21     Chlamydia * ^ Negative   21     Syphilis Ab ^ Non-reactive  Non-reactive 21     Rubella * ^ Reactive-Presumed Immune   21     HBsAg * ^ Nonreactive   21     HIV * ^ Nonreactive   21     ABO (Prenatal)        Rh (Prenatal)        Antibody screen (Prenatal)        Pap smear          2nd Trimester     Test Value Reference Range Date Time    Hgb  12.1 g/dL 11.5 - 15.5 21 2222    Hct  34.6 % 34.0 - 45.0 21 2222    Platelet Count  267 10*3/uL 140 - 350 21 2222    Syphilis Ab        1Hr GTT        Antibody screen (Prenatal)          3rd Trimester     Test Value Reference Range Date Time    GBS        GBS (discrete)*        Syphilis Ab        FFN          3 Hr GTT     Test Value Reference Range Date Time    Fasting Glucose        1 Hr Glucose        2 Hr Glucose        3 Hr Glucose          COVID-19     Test Value Reference Range Date Time    COVID-19 *           Non [facility] Type and Screen Results     Test Value Reference Range Date Time    ABO * ^ A   21     Rh * ^ Positive   21     Antibody Screen - 1st Trimester * ^ Negative   21     Antibody Screen >14 weeks          RPR  1st/2nd Trimester (Non [facility] Results)     Test Value Reference Range Date Time    RPR (1st Trimester)*        RPR (2nd Trimester)          [facility] Type and Screen Results (Inpatient)     Test Value Reference Range Date Time    ABO (Inpatient)  (See Report)    21    Rh (Inpatient)  (See Report)    21    Antibody Screen (Inpatient)  (See Report)    21      Congenital Disease Screening     Test Value Reference Range Date Time    Quad Screen        Thalassemia        Cystic Fibrosis        Sickle Cell        Prenatal Screen (NIPS/[facility])        Carrier Screening ([facility])          TORCH     Test Value Reference Range Date Time    TORCH IgG        TORCH IgM          Other     Test Value Reference Range Date Time    24h Urine Protein        Urine Toxicology Screen        TSH        A1C          * REQUIRES MANUAL ENTRY IF EXTERNAL LAB RESULTS ARE AVAILABLE       Legend    ^: Historical                OB History    Para Term  AB Living   1             SAB TAB Ectopic Molar Multiple Live Births                    # Outcome Date GA Lbr Cesar/2nd Weight Sex Delivery Anes PTL Lv   1 Current              Past Medical History:   Diagnosis Date   • Anxiety    • Asthma     has not used inhaler in years per patient     Past Surgical History:   Procedure Laterality Date   • ADENOIDECTOMY     • NASAL SEPTUM SURGERY     • TUMOR REMOVAL       Social History      Most Recent Value   Living Arrangements Alone   Support Systems --   Type of Residence Private residence        Family History   Problem Relation Age of Onset   • Diabetes Maternal Grandfather    • Heart failure Maternal Grandfather      No Known Allergies  Medications Prior to Admission   Medication Sig   • omeprazole (PriLOSEC) 20 mg capsule Take 20 mg by mouth daily   • MVP-ferrous fumarate-FA (Prenatal) 28 mg iron- 800 mcg tablet Take 1 tablet by mouth daily       Current Facility-Administered Medications:   •   "betamethasone acet,sod phos (CELESTONE) 6 mg/mL injection 12 mg, 12 mg, intramuscular, Once, Chalo Cates MD  •  [START ON 8/30/2021] betamethasone acet,sod phos (CELESTONE) 6 mg/mL injection 12 mg, 12 mg, intramuscular, Once, Chalo Cates MD  •  acetaminophen (TYLENOL) tablet 500-1,000 mg, 500-1,000 mg, oral, q6h PRN, Chalo Cates MD, 1,000 mg at 08/29/21 1256  •  LR infusion, 50 mL/hr, intravenous, Continuous, Chalo Cates MD, Last Rate: 50 mL/hr at 08/29/21 1256, 50 mL/hr at 08/29/21 1256    Review of Systems:  Pertinent items are noted in HPI.    Objective     Vital signs:  /59   Pulse 69   Temp 36.1 °C (97 °F) (Temporal)   Resp 18   Ht 1.626 m (5' 4\")   Wt 74.4 kg (164 lb 0.4 oz)   SpO2 99%   BMI 28.15 kg/m²     Maternal Type & Screen     Maternal Type & Screen ([facility] Results)     Test Value Date Time    ABO ([facility] Results)  A  08/28/21 2223    Rh ([facility] Results)  POS  08/28/21 2223    Antibody Screen ([facility] Results)  NEG  08/28/21 2223          Legend    ^: Historical                    Maternal Type & Screen     Maternal Type & Screen (External Results)     Test Value Date Time    ABO (External Results) ^ A  04/12/21     Rh (External Results) ^ Positive  04/12/21     Antibody screen (External Results) ^ Negative  04/12/21           Legend    ^: Historical                    Prenatal Results     Maternal Prenatal Labs     Test Value Date Time    Gonorrhea  ^ Negative  04/12/21     Chlamydia  ^ Negative  04/12/21     Syphilis Ab ^ Non-reactive  04/12/21     RPR        Rubella ^ Reactive-Presumed Immune  04/12/21     HBsAg  ^ Nonreactive  04/12/21     HIV ^ Nonreactive  04/12/21     GBS             Legend    ^: Historical                      Physical Exam  General:   Alert, oriented, no acute distress   Skin: Normal   Lungs:  Clear to auscultation bilaterally   Heart:  Regular rate and rhythm   Abdomen:  Gravid, non-tender. Leopold's reveals cephalic presentation, EFW 3 lb    Extremities:  Negative Elan's " bilaterally. Patellar DTRs 2+. Edema none      Pelvis/Cervix:  Normal external female genitalia.  Cervix deferred.   Uterine Size:  size equals dates and nontender to palpation   Cervical exam       NST   Fetal Heart Rate  Mode: External US  Baseline Rate: 130 bpm  Baseline Classification: Normal (110-160)  Variability: Moderate (Between 6 and 25 BPM)  Accelerations: Absent  Decelerations: Absent  Tracing Observations: broken tracing  FHR Category: Category I  Uterine Activity  Mode: [place], Patient report  Contraction Regularity: Irritability  Contraction Frequency (minutes): 14  Contraction Duration (seconds):   Contraction Quality: Mild  Resting Tone Palpated: Soft            Recent Results (from the past 24 hour(s))   Comprehensive metabolic panel Blood, Venous    Collection Time: 08/28/21 10:22 PM   Result Value Ref Range    Sodium 136 135 - 145 mmol/L    Potassium 3.6 3.5 - 5.1 mmol/L    Chloride 105 98 - 107 mmol/L    CO2 22 21 - 32 mmol/L    Anion Gap 9 3 - 11 mmol/L    BUN 15 7 - 25 mg/dL    Creatinine 0.52 (L) 0.60 - 1.10 mg/dL    Glucose 122 (H) 70 - 105 mg/dL    Calcium 8.9 8.6 - 10.3 mg/dL    AST 16 <40 U/L    ALT (SGPT) 18 7 - 52 U/L    Alkaline Phosphatase 90 37 - 98 U/L    Total Protein 6.5 6.0 - 8.3 g/dL    Albumin 3.5 3.5 - 5.3 g/dL    Total Bilirubin 0.39 0.20 - 1.40 mg/dL    eGFR 126 >60 mL/min/1.73m*2    Corrected Calcium 9.3 8.6 - 10.3 mg/dL   CBC w/auto differential Blood, Venous    Collection Time: 08/28/21 10:22 PM   Result Value Ref Range    WBC 9.1 4.5 - 10.5 10*3/uL    RBC 3.94 3.70 - 5.30 10*6/µL    Hemoglobin 12.1 11.5 - 15.5 g/dL    Hematocrit 34.6 34.0 - 45.0 %    MCV 87.8 81.0 - 97.0 fL    MCH 30.6 28.0 - 33.0 pg    MCHC 34.9 32.0 - 36.0 g/dL    RDW 11.6 11.5 - 14.0 %    Platelets 267 140 - 350 10*3/uL    MPV 8.8 6.9 - 10.8 fL    Neutrophils% 74 41 - 81 %    Lymphocytes% 18 11 - 47 %    Monocytes% 6 3 - 11 %    Eosinophils% 2 0 - 3 %    Basophils% 1 0 - 2 %    ANC (auto diff)  6.70 10*3/UL    Lymphocytes Absolute 1.60 10*3/uL    Monocytes Absolute 0.60 10*3/uL    Eosinophils Absolute 0.20 10*3/uL    Basophils Absolute 0.10 10*3/uL   Type and screen    Collection Time: 08/28/21 10:23 PM   Result Value Ref Range    ABO Type A     Rh Type POS     Antibody Screen NEG    Second/Confirm ABO/RH Type (Separate draw)    Collection Time: 08/28/21 10:23 PM   Result Value Ref Range    Confirmatory ABO/RH performed HOLD        Assessment/Plan      Active Problems:    31 weeks gestation of pregnancy    Placental abruption in third trimester    Due to patient planning to travel over 10 hours by car when she leaves the hospital, recommend continuing monitoring until tomorrow.  Patient advised about placental abruption and risks.  Discussed risks of travel.  Continuous monitoring.  Betamethasone 12 mg IM and repeat tomorrow prior to discharge.    45 minutes  _____________________________  Chalo Cates MD  08/29/21 1:31 PM

## 2021-08-29 NOTE — NURSING NOTE
"0500: Rn assisted patient to bathroom, upon returning to bed, patient stated she felt something \"coming out \". Quarter sized clot noted on pad, no other bleeding noted. Patient denies feeling any contractions. Urine clear, free of clots. EFM category one with occasional contractions. Patient stable, will continue to monitor.   "

## 2021-08-30 ENCOUNTER — APPOINTMENT (OUTPATIENT)
Dept: ULTRASOUND IMAGING | Facility: HOSPITAL | Age: 36
DRG: 831 | End: 2021-08-30
Payer: COMMERCIAL

## 2021-08-30 PROBLEM — O47.03 PRETERM UTERINE CONTRACTIONS IN THIRD TRIMESTER, ANTEPARTUM: Status: ACTIVE | Noted: 2021-08-30

## 2021-08-30 PROBLEM — O36.8390 ANTEPARTUM FETAL BRADYCARDIA AFFECTING CARE OF MOTHER: Status: ACTIVE | Noted: 2021-08-30

## 2021-08-30 PROBLEM — O60.03 PRETERM LABOR IN THIRD TRIMESTER WITHOUT DELIVERY: Status: ACTIVE | Noted: 2021-08-30

## 2021-08-30 LAB
ALBUMIN SERPL-MCNC: 3.5 G/DL (ref 3.5–5.3)
ALP SERPL-CCNC: 91 U/L (ref 37–98)
ALT SERPL-CCNC: 16 U/L (ref 7–52)
ANION GAP SERPL CALC-SCNC: 13 MMOL/L (ref 3–11)
AST SERPL-CCNC: 17 U/L
BASOPHILS # BLD AUTO: 0 10*3/UL
BASOPHILS # BLD AUTO: 0 10*3/UL
BASOPHILS NFR BLD AUTO: 0 % (ref 0–2)
BASOPHILS NFR BLD AUTO: 0 % (ref 0–2)
BILIRUB SERPL-MCNC: 0.52 MG/DL (ref 0.2–1.4)
BUN SERPL-MCNC: 6 MG/DL (ref 7–25)
CALCIUM ALBUM COR SERPL-MCNC: 7.6 MG/DL (ref 8.6–10.3)
CALCIUM SERPL-MCNC: 7.2 MG/DL (ref 8.6–10.3)
CHLORIDE SERPL-SCNC: 101 MMOL/L (ref 98–107)
CO2 SERPL-SCNC: 18 MMOL/L (ref 21–32)
CREAT SERPL-MCNC: 0.5 MG/DL (ref 0.6–1.1)
EOSINOPHIL # BLD AUTO: 0 10*3/UL
EOSINOPHIL # BLD AUTO: 0 10*3/UL
EOSINOPHIL NFR BLD AUTO: 0 % (ref 0–3)
EOSINOPHIL NFR BLD AUTO: 0 % (ref 0–3)
ERYTHROCYTE [DISTWIDTH] IN BLOOD BY AUTOMATED COUNT: 11.7 % (ref 11.5–14)
ERYTHROCYTE [DISTWIDTH] IN BLOOD BY AUTOMATED COUNT: 11.8 % (ref 11.5–14)
GFR SERPL CREATININE-BSD FRML MDRD: 127 ML/MIN/1.73M*2
GLUCOSE SERPL-MCNC: 126 MG/DL (ref 70–105)
HCT VFR BLD AUTO: 33.3 % (ref 34–45)
HCT VFR BLD AUTO: 34 % (ref 34–45)
HGB BLD-MCNC: 11.2 G/DL (ref 11.5–15.5)
HGB BLD-MCNC: 11.6 G/DL (ref 11.5–15.5)
LYMPHOCYTES # BLD AUTO: 1 10*3/UL
LYMPHOCYTES # BLD AUTO: 1.1 10*3/UL
LYMPHOCYTES NFR BLD AUTO: 10 % (ref 11–47)
LYMPHOCYTES NFR BLD AUTO: 6 % (ref 11–47)
MAGNESIUM SERPL-MCNC: 5.6 MG/DL (ref 1.8–2.4)
MCH RBC QN AUTO: 30 PG (ref 28–33)
MCH RBC QN AUTO: 30.2 PG (ref 28–33)
MCHC RBC AUTO-ENTMCNC: 33.7 G/DL (ref 32–36)
MCHC RBC AUTO-ENTMCNC: 34.3 G/DL (ref 32–36)
MCV RBC AUTO: 88.3 FL (ref 81–97)
MCV RBC AUTO: 89 FL (ref 81–97)
MONOCYTES # BLD AUTO: 0.2 10*3/UL
MONOCYTES # BLD AUTO: 0.3 10*3/UL
MONOCYTES NFR BLD AUTO: 1 % (ref 3–11)
MONOCYTES NFR BLD AUTO: 2 % (ref 3–11)
NEUTROPHILS # BLD AUTO: 10 10*3/UL
NEUTROPHILS # BLD AUTO: 15.2 10*3/UL
NEUTROPHILS NFR BLD AUTO: 89 % (ref 41–81)
NEUTROPHILS NFR BLD AUTO: 92 % (ref 41–81)
PLATELET # BLD AUTO: 261 10*3/UL (ref 140–350)
PLATELET # BLD AUTO: 261 10*3/UL (ref 140–350)
PMV BLD AUTO: 8.3 FL (ref 6.9–10.8)
PMV BLD AUTO: 8.7 FL (ref 6.9–10.8)
POTASSIUM SERPL-SCNC: 3.3 MMOL/L (ref 3.5–5.1)
PROT SERPL-MCNC: 6.3 G/DL (ref 6–8.3)
RBC # BLD AUTO: 3.74 10*6/ΜL (ref 3.7–5.3)
RBC # BLD AUTO: 3.85 10*6/ΜL (ref 3.7–5.3)
SODIUM SERPL-SCNC: 132 MMOL/L (ref 135–145)
WBC # BLD AUTO: 11.3 10*3/UL (ref 4.5–10.5)
WBC # BLD AUTO: 16.5 10*3/UL (ref 4.5–10.5)

## 2021-08-30 PROCEDURE — 6360000200 HC RX 636 W HCPCS (ALT 250 FOR IP): Performed by: OBSTETRICS & GYNECOLOGY

## 2021-08-30 PROCEDURE — 76817 TRANSVAGINAL US OBSTETRIC: CPT

## 2021-08-30 PROCEDURE — 76820 UMBILICAL ARTERY ECHO: CPT

## 2021-08-30 PROCEDURE — 76815 OB US LIMITED FETUS(S): CPT

## 2021-08-30 PROCEDURE — 85025 COMPLETE CBC W/AUTO DIFF WBC: CPT

## 2021-08-30 PROCEDURE — 83735 ASSAY OF MAGNESIUM: CPT

## 2021-08-30 PROCEDURE — 85025 COMPLETE CBC W/AUTO DIFF WBC: CPT | Performed by: OBSTETRICS & GYNECOLOGY

## 2021-08-30 PROCEDURE — 76819 FETAL BIOPHYS PROFIL W/O NST: CPT

## 2021-08-30 PROCEDURE — 80053 COMPREHEN METABOLIC PANEL: CPT

## 2021-08-30 PROCEDURE — (BLANK) HC ROOM PRIVATE OBSTETRICS

## 2021-08-30 PROCEDURE — 2580000300 HC RX 258: Performed by: OBSTETRICS & GYNECOLOGY

## 2021-08-30 PROCEDURE — 6370000100 HC RX 637 (ALT 250 FOR IP): Performed by: OBSTETRICS & GYNECOLOGY

## 2021-08-30 RX ORDER — POTASSIUM CHLORIDE 750 MG/1
20 TABLET, FILM COATED, EXTENDED RELEASE ORAL ONCE
Status: COMPLETED | OUTPATIENT
Start: 2021-08-30 | End: 2021-08-30

## 2021-08-30 RX ORDER — MAGNESIUM SULFATE HEPTAHYDRATE 40 MG/ML
2 INJECTION, SOLUTION INTRAVENOUS CONTINUOUS
Status: DISCONTINUED | OUTPATIENT
Start: 2021-08-30 | End: 2021-09-01

## 2021-08-30 RX ORDER — MAGNESIUM SULFATE HEPTAHYDRATE 40 MG/ML
INJECTION, SOLUTION INTRAVENOUS
Status: COMPLETED
Start: 2021-08-30 | End: 2021-08-30

## 2021-08-30 RX ORDER — TERBUTALINE SULFATE 1 MG/ML
0.25 INJECTION SUBCUTANEOUS ONCE
Status: DISCONTINUED | OUTPATIENT
Start: 2021-08-30 | End: 2021-08-30

## 2021-08-30 RX ORDER — SODIUM CHLORIDE, SODIUM LACTATE, POTASSIUM CHLORIDE, AND CALCIUM CHLORIDE .6; .31; .03; .02 G/100ML; G/100ML; G/100ML; G/100ML
500 INJECTION, SOLUTION INTRAVENOUS ONCE
Status: COMPLETED | OUTPATIENT
Start: 2021-08-30 | End: 2021-08-30

## 2021-08-30 RX ORDER — BETAMETHASONE SODIUM PHOSPHATE AND BETAMETHASONE ACETATE 3; 3 MG/ML; MG/ML
12 INJECTION, SUSPENSION INTRA-ARTICULAR; INTRALESIONAL; INTRAMUSCULAR; SOFT TISSUE ONCE
Status: COMPLETED | OUTPATIENT
Start: 2021-08-30 | End: 2021-08-30

## 2021-08-30 RX ADMIN — POTASSIUM CHLORIDE 20 MEQ: 750 TABLET, FILM COATED, EXTENDED RELEASE ORAL at 16:19

## 2021-08-30 RX ADMIN — MAGNESIUM SULFATE IN WATER 2 G/HR: 40 INJECTION, SOLUTION INTRAVENOUS at 02:59

## 2021-08-30 RX ADMIN — FAMOTIDINE 20 MG: 20 TABLET ORAL at 12:45

## 2021-08-30 RX ADMIN — ONDANSETRON 8 MG: 2 INJECTION INTRAMUSCULAR; INTRAVENOUS at 20:14

## 2021-08-30 RX ADMIN — SODIUM CHLORIDE, POTASSIUM CHLORIDE, SODIUM LACTATE AND CALCIUM CHLORIDE 500 ML: 600; 310; 30; 20 INJECTION, SOLUTION INTRAVENOUS at 01:55

## 2021-08-30 RX ADMIN — ONDANSETRON 8 MG: 2 INJECTION INTRAMUSCULAR; INTRAVENOUS at 13:06

## 2021-08-30 RX ADMIN — Medication 1000 MG: at 11:47

## 2021-08-30 RX ADMIN — FAMOTIDINE 20 MG: 20 TABLET ORAL at 20:28

## 2021-08-30 RX ADMIN — MAGNESIUM SULFATE IN WATER 2.5 G/HR: 40 INJECTION, SOLUTION INTRAVENOUS at 14:10

## 2021-08-30 RX ADMIN — BETAMETHASONE SODIUM PHOSPHATE AND BETAMETHASONE ACETATE 12 MG: 3; 3 INJECTION, SUSPENSION INTRA-ARTICULAR; INTRALESIONAL; INTRAMUSCULAR at 02:45

## 2021-08-30 RX ADMIN — MAGNESIUM SULFATE IN WATER 2.5 G/HR: 40 INJECTION, SOLUTION INTRAVENOUS at 07:52

## 2021-08-30 RX ADMIN — MAGNESIUM SULFATE HEPTAHYDRATE 4 G: 40 INJECTION, SOLUTION INTRAVENOUS at 02:31

## 2021-08-30 RX ADMIN — SODIUM CHLORIDE, POTASSIUM CHLORIDE, SODIUM LACTATE AND CALCIUM CHLORIDE 75 ML/HR: 600; 310; 30; 20 INJECTION, SOLUTION INTRAVENOUS at 12:47

## 2021-08-30 RX ADMIN — MAGNESIUM SULFATE IN WATER 2.5 G/HR: 40 INJECTION, SOLUTION INTRAVENOUS at 20:43

## 2021-08-30 RX ADMIN — ONDANSETRON 8 MG: 2 INJECTION INTRAMUSCULAR; INTRAVENOUS at 04:45

## 2021-08-30 NOTE — PROGRESS NOTES
"Progress Note    Date of Service:   2021    Viraj Mc is a 35 y.o.  at 31w6d gestational age. Estimated Date of Delivery: 10/26/21.  Patient began to have a decrease in fetal baseline around 2230 hr.  Bedside ultrasound requested and observed.  Cephalic presentation, posterior placenta, no obvious significant abruption.      Objective     Intake/Output Summary (Last 24 hours) at 2021 0316  Last data filed at 2021 0030  Gross per 24 hour   Intake 1679.29 ml   Output 2350 ml   Net -670.71 ml       Current Facility-Administered Medications:   •  magnesium sulfate 20 gram in 500 mL infusion (premix), 2 g/hr, intravenous, Continuous, Chalo Cates MD, Last Rate: 50 mL/hr at 21 0259, 2 g/hr at 21 0259  •  acetaminophen (TYLENOL) tablet 500-1,000 mg, 500-1,000 mg, oral, q6h PRN, Chalo Cates MD, 1,000 mg at 21 1256  •  famotidine (PEPCID) tablet 20 mg, 20 mg, oral, 2x daily, Chalo Cates MD, 20 mg at 21  •  LR infusion, 50 mL/hr, intravenous, Continuous, Chalo Cates MD, Last Rate: 50 mL/hr at 21 1614, 50 mL/hr at 21 1614    Physical Exam  /69   Pulse 94   Temp 36.4 °C (97.5 °F) (Temporal)   Resp 16   Ht 1.626 m (5' 4\")   Wt 74.4 kg (164 lb 0.4 oz)   SpO2 99%   BMI 28.15 kg/m²     General:   Alert, oriented, no acute distress   Skin: Normal   Lungs:  Clear to auscultation bilaterally   Heart:  Regular rate and rhythm   Abdomen:  Gravid, non-tender.   Extremities:  Negative Elan's bilaterally. Patellar DTRs 2+. Edema none   Pelvis/Cervix:  Normal external female genitalia.  Cervix per nursing.  No active bleeding vaginally per nursing report.       Uterine Size:  size equals dates     Cervical Exam  Dilation: 1  Cervical Consistency: Soft  Cervical Position: Posterior  Method: Manual  OB Examiner: Jo RN    NST     Fetal Heart Rate  Mode: Doppler  Baseline Rate: 100 bpm  Baseline Classification: Bradycardia (<110)  Variability: " Moderate (Between 6 and 25 BPM)  Accelerations: Present  Decelerations: Variable decelerations  Tracing Observations: broken tracing due to patient ambulating at bedside  FHR Category: Category II  Uterine Activity  Mode: [place]  Contraction Regularity: Regular  Contraction Frequency (minutes): 2-3  Contraction Duration (seconds): 50  Contraction Quality: Mild  Resting Tone Palpated: Soft          Recent Results (from the past 24 hour(s))   CBC w/auto differential Blood, Venous    Collection Time: 08/29/21  1:23 PM   Result Value Ref Range    WBC 10.4 4.5 - 10.5 10*3/uL    RBC 3.81 3.70 - 5.30 10*6/µL    Hemoglobin 11.8 11.5 - 15.5 g/dL    Hematocrit 33.8 (L) 34.0 - 45.0 %    MCV 88.6 81.0 - 97.0 fL    MCH 30.9 28.0 - 33.0 pg    MCHC 34.9 32.0 - 36.0 g/dL    RDW 11.8 11.5 - 14.0 %    Platelets 242 140 - 350 10*3/uL    MPV 8.3 6.9 - 10.8 fL    Neutrophils% 79 41 - 81 %    Lymphocytes% 15 11 - 47 %    Monocytes% 5 3 - 11 %    Eosinophils% 1 0 - 3 %    Basophils% 1 0 - 2 %    ANC (auto diff) 8.20 10*3/UL    Lymphocytes Absolute 1.50 10*3/uL    Monocytes Absolute 0.50 10*3/uL    Eosinophils Absolute 0.10 10*3/uL    Basophils Absolute 0.10 10*3/uL   CBC w/auto differential Blood, Venous    Collection Time: 08/30/21  2:38 AM   Result Value Ref Range    WBC 11.3 (H) 4.5 - 10.5 10*3/uL    RBC 3.85 3.70 - 5.30 10*6/µL    Hemoglobin 11.6 11.5 - 15.5 g/dL    Hematocrit 34.0 34.0 - 45.0 %    MCV 88.3 81.0 - 97.0 fL    MCH 30.2 28.0 - 33.0 pg    MCHC 34.3 32.0 - 36.0 g/dL    RDW 11.7 11.5 - 14.0 %    Platelets 261 140 - 350 10*3/uL    MPV 8.7 6.9 - 10.8 fL    Neutrophils% 89 (H) 41 - 81 %    Lymphocytes% 10 (L) 11 - 47 %    Monocytes% 1 (L) 3 - 11 %    Eosinophils% 0 0 - 3 %    Basophils% 0 0 - 2 %    ANC (auto diff) 10.00 10*3/UL    Lymphocytes Absolute 1.10 10*3/uL    Monocytes Absolute 0.20 10*3/uL    Eosinophils Absolute 0.00 10*3/uL    Basophils Absolute 0.00 10*3/uL       Assessment/Plan   IUP at 31w6d.   Active Problems:     31 weeks gestation of pregnancy    Placental abruption in third trimester    Antepartum fetal bradycardia affecting care of mother     uterine contractions in third trimester, antepartum        Plan: The patient was given second dose of betamethasone.  Magnesium sulfate 4 gm bolus started and will continue with 2 gm per hour to attempt tocolysis.  Patient counseled about the concerns of fetal heart tracing.  If fetal bradycardia worsens, or loss of variability, will consider immediate abdominal delivery.  Patient counseled about  delivery indications, and risks of surgery.  Patient to be NPO at this time.  .     _______________________________  Chalo Cates MD  21 3:16 AM

## 2021-08-30 NOTE — NURSING NOTE
0721- Return call from Dr Cates, updated on pt status, had received 2gm magnesium bolus at 0545, magnesium continues to run at 2.5gm/hr.  contractions continue to be every 2-3, pt rates pain 5/10 to RLQ, abd palpates soft nontender, category 1 FHT. Orders received for 3gm magnesium bolus then continue 2.5gm/hr.    0730-To pt bedside, discuss plan with pt and significant other, agree with plan.  3gm mag bolus initiated, fan provided as well as ice pack for pt, instructed to voice if feeling ligh headed or shortness of breath.    0800- Pt tolerated bolus, positioned on right lateral to rest, monitors adjusted, significant other remains at bedside with call light within reach, instructed to call with needs or concerns.    0849- Dr Cates calls, updated on pt status, UC every 3-10 min.  Pt sleeping, reported when bolus was complete she was no longer feeling the contractions.  Order received for transvag US to evaluate location of placenta in relation to cervix.    0905-US tech at bedside to preform transvaginal US, noted that the placenta is noted to be covering placenta.  Pt assisted to bathroom after completion of US, slightly unsteady on her feet requiring stand by assist. Back to bed with call light and signifigant other at bedside.    0950- Dr Cates notified of US results.  No new orders at this time.    1135- Dr Cates in to see pt, discusses plan of care.    1345- Dr Cates notified of magnesium level. Orders received.    1400- Pt armani 2gm magnesium bolus well.  Denies nausea, weakness or SOB.  Significant other remains at bedside. Pt denies feeling contractions.  Continues to have no vaginal bleeding.    1600- Pt sleeping, resp even, irregular contractions.    1800- Pt assisted up to bathroom, no bleeding noted, states she is not feeling any contractions, reports +fetal movement.  Pt steady on feet requires stand by assist, denies vertigo or shortness of breath.  DTR remain +4 to BLE.  SCD back on upon  return to bed, pt denies needs, call bell within reach.

## 2021-08-30 NOTE — PROGRESS NOTES
"Progress Note    Date of Service:   2021    Viraj Mc is a 35 y.o.  at 31w6d gestational age. Estimated Date of Delivery: 10/26/21.  Patient reports contractions less intense.  Patient received 3 gm bolus of MgSO4 this morning and contractions slowed and pain lessened.  Reactive, Cat 1 FHT.       Objective     Intake/Output Summary (Last 24 hours) at 2021 1251  Last data filed at 2021 1245  Gross per 24 hour   Intake 3582.06 ml   Output 4400 ml   Net -817.94 ml       Current Facility-Administered Medications:   •  magnesium sulfate 20 gram in 500 mL infusion (premix), 2.5 g/hr, intravenous, Continuous, Chalo Cates MD, Last Rate: 62.5 mL/hr at 21 0752, 2.5 g/hr at 21 0752  •  ondansetron (ZOFRAN) 8 mg in sodium chloride 0.9 % 50 mL IVPB, 8 mg, intravenous, q6h PRN, Chalo Cates MD, Stopped at 21 0500  •  acetaminophen (TYLENOL) tablet 500-1,000 mg, 500-1,000 mg, oral, q6h PRN, Chalo Cates MD, 1,000 mg at 21 1147  •  famotidine (PEPCID) tablet 20 mg, 20 mg, oral, 2x daily, Chalo Cates MD, 20 mg at 21 1245  •  LR infusion, 75 mL/hr, intravenous, Continuous, Chalo Cates MD, Last Rate: 75 mL/hr at 21 1247, 75 mL/hr at 21 1247    Physical Exam  BP 92/52   Pulse 105   Temp 36.7 °C (98.1 °F) (Oral)   Resp 16   Ht 1.626 m (5' 4\")   Wt 74.4 kg (164 lb 0.4 oz)   SpO2 98%   BMI 28.15 kg/m²     General:   Alert, oriented, no acute distress   Skin: Normal   Lungs:  Clear to auscultation bilaterally   Heart:  Regular rate and rhythm   Abdomen:  Gravid, non-tender.   Extremities:  Negative Elan's bilaterally. Patellar DTRs 1+. Edema none   Pelvis/Cervix:  Normal external female genitalia.  Cervix per nursing.       Uterine Size:  size equals dates     Cervical Exam  Dilation: Closed (See notes)  Cervical Consistency: Soft  Cervical Position: Posterior  Method: Manual  OB Examiner: Sully Cobb RN    NST     Fetal Heart Rate  Mode: " Doppler  Baseline Rate: 120 bpm  Baseline Classification: Normal (110-160)  Variability: Moderate (Between 6 and 25 BPM)  Accelerations: Present  Decelerations: Absent  Tracing Observations: tracing picking up moms heart rate from 0404 -0414 patient sitting up throwing up-  FHR Category: Category I  Uterine Activity  Mode: [place]  Contraction Regularity: Regular  Contraction Frequency (minutes): 3-14  Contraction Duration (seconds): 60-70  Contraction Quality: Mild  Resting Tone Palpated: Soft          Recent Results (from the past 24 hour(s))   CBC w/auto differential Blood, Venous    Collection Time: 08/29/21  1:23 PM   Result Value Ref Range    WBC 10.4 4.5 - 10.5 10*3/uL    RBC 3.81 3.70 - 5.30 10*6/µL    Hemoglobin 11.8 11.5 - 15.5 g/dL    Hematocrit 33.8 (L) 34.0 - 45.0 %    MCV 88.6 81.0 - 97.0 fL    MCH 30.9 28.0 - 33.0 pg    MCHC 34.9 32.0 - 36.0 g/dL    RDW 11.8 11.5 - 14.0 %    Platelets 242 140 - 350 10*3/uL    MPV 8.3 6.9 - 10.8 fL    Neutrophils% 79 41 - 81 %    Lymphocytes% 15 11 - 47 %    Monocytes% 5 3 - 11 %    Eosinophils% 1 0 - 3 %    Basophils% 1 0 - 2 %    ANC (auto diff) 8.20 10*3/UL    Lymphocytes Absolute 1.50 10*3/uL    Monocytes Absolute 0.50 10*3/uL    Eosinophils Absolute 0.10 10*3/uL    Basophils Absolute 0.10 10*3/uL   CBC w/auto differential Blood, Venous    Collection Time: 08/30/21  2:38 AM   Result Value Ref Range    WBC 11.3 (H) 4.5 - 10.5 10*3/uL    RBC 3.85 3.70 - 5.30 10*6/µL    Hemoglobin 11.6 11.5 - 15.5 g/dL    Hematocrit 34.0 34.0 - 45.0 %    MCV 88.3 81.0 - 97.0 fL    MCH 30.2 28.0 - 33.0 pg    MCHC 34.3 32.0 - 36.0 g/dL    RDW 11.7 11.5 - 14.0 %    Platelets 261 140 - 350 10*3/uL    MPV 8.7 6.9 - 10.8 fL    Neutrophils% 89 (H) 41 - 81 %    Lymphocytes% 10 (L) 11 - 47 %    Monocytes% 1 (L) 3 - 11 %    Eosinophils% 0 0 - 3 %    Basophils% 0 0 - 2 %    ANC (auto diff) 10.00 10*3/UL    Lymphocytes Absolute 1.10 10*3/uL    Monocytes Absolute 0.20 10*3/uL    Eosinophils Absolute  0.00 10*3/uL    Basophils Absolute 0.00 10*3/uL       Assessment/Plan   IUP at 31w6d.   Active Problems:    31 weeks gestation of pregnancy    Placental abruption in third trimester    Antepartum fetal bradycardia affecting care of mother     uterine contractions in third trimester, antepartum        Plan: continue present management.  Continue MgSO4 for minimum 48 hr for steroid window.  CBC, CMP, MgSO4 level now.  Ultrasound for growth tomorrow.    _______________________________  Chalo Cates MD  21 12:51 PM

## 2021-08-30 NOTE — NURSING NOTE
The last time I did  I was unable to feel all the way through the cervix. With this exam I was able to feel all the way to the end and I feel like the cervix is closed but the outer OS is 3cm and the cervix feels thick and soft.  No presenting part felt.  Dark brown blood noted on my glove.

## 2021-08-30 NOTE — CROSS COVER NOTE
Pt just waking up from nap at time of assessment, feels comfortable at this time, not feeling contractions     EFM: Baseline 125, moderate variability, + accels, no decels; category 1  [place]: no ctx appreciated.     CV: RRR, no murmurs, rubs or gallops  Resp: lungs CTA bilaterally, no wheezes or rhonchi  Neuro: DTRs 3+ bilaterally, patellar and Achilles, no clonus      Angelique Dodd  Family Medicine PGY1  1620 08/30/2021

## 2021-08-30 NOTE — PLAN OF CARE
Problem: Pain - Adult  Goal: Verbalizes/displays adequate comfort level or baseline comfort level  Description: INTERVENTIONS:  1. Encourage patient to monitor pain and request interventions  2. Assess pain using the appropriate pain scale  3. Administer analgesics based on type and severity of pain and evaluate response  4. Educate/Implement non-pharmacological measures as appropriate and evaluate response  5. Consider cultural, developmental and social influences on pain and pain management  6. Notify Provider if interventions unsuccessful or patient reports new pain  Outcome: Progressing  Flowsheets (Taken 8/29/2021 1913)  Verbalizes/displays adequate comfort level or baseline comfort level:   Encourage patient to monitor pain and request interventions   Administer analgesics based on type and severity of pain and evaluate response   Assess pain using the appropriate pain scale   Educate/Implement non-pharmacological measures as appropriate and evaluate response   Consider cultural, developmental and social influences on pain and pain management     Problem: Infection - Adult  Goal: Absence of infection during hospitalization  Description: INTERVENTIONS:  1. Assess and monitor for signs and symptoms of infection  2. Monitor lab/diagnostic results  3. Monitor all insertion sites/wounds/incisions  4. Monitor secretions for changes in amount and color  5. Administer medications as ordered  6. Educate and encourage patient and family to use good hand hygiene technique  7. Identify and educate in appropriate isolation precautions for identified infection/condition  Outcome: Progressing  Flowsheets (Taken 8/29/2021 1913)  Absence of infection during hospitalization:   Assess and monitor for signs and symptoms of infection   Monitor lab/diagnostic results   Monitor all insertion sites/wounds/incisions   Monitor secretions for changes in amount and colo   Administer medications as ordered   Educate and encourage  patient and family to use good hand hygiene technique     Problem: Safety Adult - Fall  Goal: Free from fall injury  Description: INTERVENTIONS:    Inpatient - Please reference Cares/Safety Flowsheet under Fair Fall Risk for interventions.  Pediatrics - Please reference Peds Daily Cares/Safety Flowsheet under [place] Pediatric Fall Assessment Fall Bundle for interventions  LD/OB - Please reference OB Shift Screening Flowsheet under OB Fall Risk for interventions.  Outcome: Progressing  Note: Patient and significant other educated on fall precaution. Upper side rails up, bed in lowest position, wheels locked, red socks on, and call light within reach.      Problem: Discharge Planning  Goal: Discharge to home or other facility with appropriate resources  Description: INTERVENTIONS:  1. Identify and discuss barriers to discharge with patient and caregiver.  2. Arrange for needed discharge resources and transportation as appropriate.  3. Identify discharge learning needs (meds, wound care, etc).  4. Arrange for interpreters to assist at discharge as needed.  5. Refer to  for coordinating discharge planning if the patient needs post-hospital services based on physician order or complex needs related to functional status, cognitive ability or social support system.  Outcome: Progressing     Problem: Antepartum  Goal: Maintain pregnancy as long as maternal and/or fetal condition is stable  Description: INTERVENTIONS:  1. Maternal surveillance  2. Fetal surveillance  3. Monitor uterine activity  4. Medications as ordered  5. Bedrest  Outcome: Progressing  Flowsheets (Taken 8/29/2021 1913)  Maintain pregnancy as long as maternal and/or fetal condition is stable:   Maternal surveillance   Fetal surveillance   Monitor uterine activity   Medications as ordered

## 2021-08-30 NOTE — NURSING END OF SHIFT
Nursing End of Shift Summary    Goals:  Clinical Goals for the Shift: Maintain pregnancy, no bleeding    Narrative Summary of Progress Towards Clinical Goals:  Pregnancy maintained. Bleeding is intermittent with voiding and is now dark brown.     Barriers for Transfer or Discharge: yes  Plan to discharge home tomorrow.     SBAR to TOBIAS Winston RN to assume care of pt.

## 2021-08-30 NOTE — NURSING NOTE
"Since 2300 EFM baseline ranges from  with increasing difficulty to capture EFM continuously, audible fetal movement with RN at bedside locating heart tones. [place] monitor picking up occasional contractions, patient states she would feel one \"every once in a while\" Multiple RNs attempting to locate heart tones with different patient position changes, EFM monitor replaced.     0010: 500 cc bolus started    0023 Md cates called, communicated appearance of EFM and difficulty capturing consistenly EFM. Orders for BPP bedside.     0030 patient stated she is feeling lower abdominal pain rating it a 4/10. Abdomen palpates soft. Patient ambulated to bathroom voided 400 clear urine.     0038 patient returned to bed attempting to locate heart tones, monitors removed at 0041 for ultrasound at bedside.     Patient off monitor from 0041 to 0108 for BPP    0138: MD Cates called with preliminary BPP 8/8 patient abhijeet, patient states she is only feeling contractions occasionally stating she is having lower back pain. Orders given for 500cc bolus and 1 dose of terbutaline    0140: MD Cates called back stated to move patient to labor room and perform cervical exam, call with results.    0152: MD Cates called with SVE results, order to hold terbutaline MD en route     2024: MD Cates at bedside, orders given for mag sulfate, betamethasone, cbc, bedside ultrasound    8925-1718: Patient off monitor for bedside ultrasound  "

## 2021-08-31 ENCOUNTER — APPOINTMENT (OUTPATIENT)
Dept: ULTRASOUND IMAGING | Facility: HOSPITAL | Age: 36
DRG: 831 | End: 2021-08-31
Payer: COMMERCIAL

## 2021-08-31 LAB
ALBUMIN SERPL-MCNC: 3.4 G/DL (ref 3.5–5.3)
ALP SERPL-CCNC: 84 U/L (ref 37–98)
ALT SERPL-CCNC: 16 U/L (ref 7–52)
ANION GAP SERPL CALC-SCNC: 12 MMOL/L (ref 3–11)
AST SERPL-CCNC: 15 U/L
BASOPHILS # BLD AUTO: 0 10*3/UL
BASOPHILS NFR BLD AUTO: 0 % (ref 0–2)
BILIRUB SERPL-MCNC: 0.44 MG/DL (ref 0.2–1.4)
BUN SERPL-MCNC: 5 MG/DL (ref 7–25)
CALCIUM ALBUM COR SERPL-MCNC: 7.2 MG/DL (ref 8.6–10.3)
CALCIUM SERPL-MCNC: 6.7 MG/DL (ref 8.6–10.3)
CHLORIDE SERPL-SCNC: 101 MMOL/L (ref 98–107)
CO2 SERPL-SCNC: 17 MMOL/L (ref 21–32)
CREAT SERPL-MCNC: 0.53 MG/DL (ref 0.6–1.1)
EOSINOPHIL # BLD AUTO: 0 10*3/UL
EOSINOPHIL NFR BLD AUTO: 0 % (ref 0–3)
ERYTHROCYTE [DISTWIDTH] IN BLOOD BY AUTOMATED COUNT: 11.7 % (ref 11.5–14)
GFR SERPL CREATININE-BSD FRML MDRD: 125 ML/MIN/1.73M*2
GLUCOSE SERPL-MCNC: 117 MG/DL (ref 70–105)
HCT VFR BLD AUTO: 30.7 % (ref 34–45)
HGB BLD-MCNC: 10.6 G/DL (ref 11.5–15.5)
LYMPHOCYTES # BLD AUTO: 1.2 10*3/UL
LYMPHOCYTES NFR BLD AUTO: 10 % (ref 11–47)
MCH RBC QN AUTO: 30.5 PG (ref 28–33)
MCHC RBC AUTO-ENTMCNC: 34.4 G/DL (ref 32–36)
MCV RBC AUTO: 88.7 FL (ref 81–97)
MONOCYTES # BLD AUTO: 0.5 10*3/UL
MONOCYTES NFR BLD AUTO: 4 % (ref 3–11)
NEUTROPHILS # BLD AUTO: 10.5 10*3/UL
NEUTROPHILS NFR BLD AUTO: 86 % (ref 41–81)
PLATELET # BLD AUTO: 273 10*3/UL (ref 140–350)
PMV BLD AUTO: 8.2 FL (ref 6.9–10.8)
POTASSIUM SERPL-SCNC: 3.3 MMOL/L (ref 3.5–5.1)
PROT SERPL-MCNC: 6.1 G/DL (ref 6–8.3)
RBC # BLD AUTO: 3.46 10*6/ΜL (ref 3.7–5.3)
SODIUM SERPL-SCNC: 130 MMOL/L (ref 135–145)
WBC # BLD AUTO: 12 10*3/UL (ref 4.5–10.5)

## 2021-08-31 PROCEDURE — 6360000200 HC RX 636 W HCPCS (ALT 250 FOR IP): Performed by: STUDENT IN AN ORGANIZED HEALTH CARE EDUCATION/TRAINING PROGRAM

## 2021-08-31 PROCEDURE — 2590000100 HC RX 259

## 2021-08-31 PROCEDURE — 2580000300 HC RX 258: Performed by: OBSTETRICS & GYNECOLOGY

## 2021-08-31 PROCEDURE — 6360000200 HC RX 636 W HCPCS (ALT 250 FOR IP): Performed by: OBSTETRICS & GYNECOLOGY

## 2021-08-31 PROCEDURE — (BLANK) HC ROOM PRIVATE OBSTETRICS

## 2021-08-31 PROCEDURE — 6360000200 HC RX 636 W HCPCS (ALT 250 FOR IP)

## 2021-08-31 PROCEDURE — 85025 COMPLETE CBC W/AUTO DIFF WBC: CPT

## 2021-08-31 PROCEDURE — 80053 COMPREHEN METABOLIC PANEL: CPT

## 2021-08-31 PROCEDURE — 76816 OB US FOLLOW-UP PER FETUS: CPT

## 2021-08-31 PROCEDURE — 2500000200 HC RX 250 WO HCPCS

## 2021-08-31 RX ORDER — POTASSIUM CHLORIDE 7.45 MG/ML
10 INJECTION INTRAVENOUS ONCE
Status: COMPLETED | OUTPATIENT
Start: 2021-08-31 | End: 2021-08-31

## 2021-08-31 RX ORDER — ESOMEPRAZOLE SODIUM 40 MG/1
40 INJECTION, POWDER, LYOPHILIZED, FOR SOLUTION INTRAVENOUS
Status: DISCONTINUED | OUTPATIENT
Start: 2021-08-31 | End: 2021-09-02 | Stop reason: HOSPADM

## 2021-08-31 RX ADMIN — SODIUM CHLORIDE, POTASSIUM CHLORIDE, SODIUM LACTATE AND CALCIUM CHLORIDE 75 ML/HR: 600; 310; 30; 20 INJECTION, SOLUTION INTRAVENOUS at 03:57

## 2021-08-31 RX ADMIN — POTASSIUM CHLORIDE 10 MEQ: 10 INJECTION, SOLUTION INTRAVENOUS at 14:06

## 2021-08-31 RX ADMIN — MAGNESIUM SULFATE IN WATER 2 G/HR: 40 INJECTION, SOLUTION INTRAVENOUS at 13:59

## 2021-08-31 RX ADMIN — SODIUM CHLORIDE, POTASSIUM CHLORIDE, SODIUM LACTATE AND CALCIUM CHLORIDE 75 ML/HR: 600; 310; 30; 20 INJECTION, SOLUTION INTRAVENOUS at 12:07

## 2021-08-31 RX ADMIN — MAGNESIUM SULFATE IN WATER 2.5 G/HR: 40 INJECTION, SOLUTION INTRAVENOUS at 04:39

## 2021-08-31 RX ADMIN — POTASSIUM CHLORIDE 10 MEQ: 10 INJECTION, SOLUTION INTRAVENOUS at 16:36

## 2021-08-31 RX ADMIN — LIDOCAINE HYDROCHLORIDE 45 ML: 20 SOLUTION ORAL; TOPICAL at 09:52

## 2021-08-31 RX ADMIN — Medication 500 MG: at 23:03

## 2021-08-31 RX ADMIN — ESOMEPRAZOLE SODIUM 40 MG: 40 INJECTION, POWDER, LYOPHILIZED, FOR SOLUTION INTRAVENOUS at 08:41

## 2021-08-31 RX ADMIN — ONDANSETRON 8 MG: 2 INJECTION INTRAMUSCULAR; INTRAVENOUS at 03:57

## 2021-08-31 RX ADMIN — POTASSIUM CHLORIDE 10 MEQ: 10 INJECTION, SOLUTION INTRAVENOUS at 12:09

## 2021-08-31 RX ADMIN — POTASSIUM CHLORIDE 10 MEQ: 10 INJECTION, SOLUTION INTRAVENOUS at 15:21

## 2021-08-31 NOTE — PROGRESS NOTES
"Progress Note    Date of Service:   2021    Viraj Mc is a 35 y.o.  at 32w0d gestational age. Estimated Date of Delivery: 10/26/21.  Patient reports heartburn.  Contractions have slowed.       Objective     Intake/Output Summary (Last 24 hours) at 2021 0935  Last data filed at 2021 0725  Gross per 24 hour   Intake 5436.88 ml   Output 3100 ml   Net 2336.88 ml       Current Facility-Administered Medications:   •  esomeprazole (NexIUM) injection 40 mg, 40 mg, intravenous, Daily at 1600, Angelique Dodd DO, 40 mg at 21 0841  •  antacid/lidocaine 2% viscous (GI COCKTAIL KIT) 45 mL suspension, 45 mL, oral, Once, Angelique Dodd DO  •  magnesium sulfate 20 gram in 500 mL infusion (premix), 2 g/hr, intravenous, Continuous, Chalo Cates MD, Last Rate: 50 mL/hr at 21 09, 2 g/hr at 21 09  •  ondansetron (ZOFRAN) 8 mg in sodium chloride 0.9 % 50 mL IVPB, 8 mg, intravenous, q6h PRN, Chalo Cates MD, Stopped at 21 0411  •  acetaminophen (TYLENOL) tablet 500-1,000 mg, 500-1,000 mg, oral, q6h PRN, Chalo Cates MD, 1,000 mg at 21 1147  •  famotidine (PEPCID) tablet 20 mg, 20 mg, oral, 2x daily, Chalo Cates MD, 20 mg at 21  •  LR infusion, 75 mL/hr, intravenous, Continuous, Chalo Cates MD, Last Rate: 75 mL/hr at 21, 75 mL/hr at 21    Physical Exam  /53   Pulse 101   Temp 36.9 °C (98.4 °F) (Oral)   Resp 18   Ht 1.626 m (5' 4\")   Wt 74.4 kg (164 lb 0.4 oz)   SpO2 98%   BMI 28.15 kg/m²     General:   Alert, oriented, no acute distress   Skin: Normal   Lungs:  Clear to auscultation bilaterally   Heart:  Regular rate and rhythm   Abdomen:  Gravid, non-tender.   Extremities:  Negative Elan's bilaterally. Patellar DTRs 2+. Edema none   Pelvis/Cervix:  Normal external female genitalia.  Cervix deferred.       Uterine Size:  size equals dates     Cervical Exam  Dilation: Closed (See notes)  Cervical Consistency: " Soft  Cervical Position: Posterior  Method: Manual  OB Examiner: Sully Cobb RN    NST     Fetal Heart Rate  Mode: Doppler  Baseline Rate: 125 bpm  Baseline Classification: Normal (110-160)  Variability: Moderate (Between 6 and 25 BPM)  Accelerations: Present  Decelerations: Absent  Tracing Observations: tracing picking up moms heart rate from 0404 -9054 patient sitting up throwing up-  FHR Category: Category I  Uterine Activity  Mode: [place]  Contraction Regularity: Irregular  Contraction Frequency (minutes): 10-30  Contraction Duration (seconds): 90  Contraction Quality: Mild  Resting Tone Palpated: Soft          Recent Results (from the past 24 hour(s))   CBC w/auto differential Blood, Venous    Collection Time: 08/30/21 12:52 PM   Result Value Ref Range    WBC 16.5 (H) 4.5 - 10.5 10*3/uL    RBC 3.74 3.70 - 5.30 10*6/µL    Hemoglobin 11.2 (L) 11.5 - 15.5 g/dL    Hematocrit 33.3 (L) 34.0 - 45.0 %    MCV 89.0 81.0 - 97.0 fL    MCH 30.0 28.0 - 33.0 pg    MCHC 33.7 32.0 - 36.0 g/dL    RDW 11.8 11.5 - 14.0 %    Platelets 261 140 - 350 10*3/uL    MPV 8.3 6.9 - 10.8 fL    Neutrophils% 92 (H) 41 - 81 %    Lymphocytes% 6 (L) 11 - 47 %    Monocytes% 2 (L) 3 - 11 %    Eosinophils% 0 0 - 3 %    Basophils% 0 0 - 2 %    ANC (auto diff) 15.20 10*3/UL    Lymphocytes Absolute 1.00 10*3/uL    Monocytes Absolute 0.30 10*3/uL    Eosinophils Absolute 0.00 10*3/uL    Basophils Absolute 0.00 10*3/uL   Comprehensive metabolic panel Blood, Venous    Collection Time: 08/30/21 12:52 PM   Result Value Ref Range    Sodium 132 (L) 135 - 145 mmol/L    Potassium 3.3 (L) 3.5 - 5.1 mmol/L    Chloride 101 98 - 107 mmol/L    CO2 18 (L) 21 - 32 mmol/L    Anion Gap 13 (H) 3 - 11 mmol/L    BUN 6 (L) 7 - 25 mg/dL    Creatinine 0.50 (L) 0.60 - 1.10 mg/dL    Glucose 126 (H) 70 - 105 mg/dL    Calcium 7.2 (L) 8.6 - 10.3 mg/dL    AST 17 <40 U/L    ALT (SGPT) 16 7 - 52 U/L    Alkaline Phosphatase 91 37 - 98 U/L    Total Protein 6.3 6.0 - 8.3 g/dL    Albumin  3.5 3.5 - 5.3 g/dL    Total Bilirubin 0.52 0.20 - 1.40 mg/dL    eGFR 127 >60 mL/min/1.73m*2    Corrected Calcium 7.6 (L) 8.6 - 10.3 mg/dL   Magnesium Blood, Venous    Collection Time: 21 12:52 PM   Result Value Ref Range    Magnesium 5.6 (HH) 1.8 - 2.4 mg/dL   CBC w/auto differential Blood, Venous    Collection Time: 21  8:51 AM   Result Value Ref Range    WBC 12.0 (H) 4.5 - 10.5 10*3/uL    RBC 3.46 (L) 3.70 - 5.30 10*6/µL    Hemoglobin 10.6 (L) 11.5 - 15.5 g/dL    Hematocrit 30.7 (L) 34.0 - 45.0 %    MCV 88.7 81.0 - 97.0 fL    MCH 30.5 28.0 - 33.0 pg    MCHC 34.4 32.0 - 36.0 g/dL    RDW 11.7 11.5 - 14.0 %    Platelets 273 140 - 350 10*3/uL    MPV 8.2 6.9 - 10.8 fL    Neutrophils% 86 (H) 41 - 81 %    Lymphocytes% 10 (L) 11 - 47 %    Monocytes% 4 3 - 11 %    Eosinophils% 0 0 - 3 %    Basophils% 0 0 - 2 %    ANC (auto diff) 10.50 10*3/UL    Lymphocytes Absolute 1.20 10*3/uL    Monocytes Absolute 0.50 10*3/uL    Eosinophils Absolute 0.00 10*3/uL    Basophils Absolute 0.00 10*3/uL   Comprehensive metabolic panel Blood, Venous    Collection Time: 21  8:51 AM   Result Value Ref Range    Sodium 130 (L) 135 - 145 mmol/L    Potassium 3.3 (L) 3.5 - 5.1 mmol/L    Chloride 101 98 - 107 mmol/L    CO2 17 (L) 21 - 32 mmol/L    Anion Gap 12 (H) 3 - 11 mmol/L    BUN 5 (L) 7 - 25 mg/dL    Creatinine 0.53 (L) 0.60 - 1.10 mg/dL    Glucose 117 (H) 70 - 105 mg/dL    Calcium 6.7 (L) 8.6 - 10.3 mg/dL    AST 15 <40 U/L    ALT (SGPT) 16 7 - 52 U/L    Alkaline Phosphatase 84 37 - 98 U/L    Total Protein 6.1 6.0 - 8.3 g/dL    Albumin 3.4 (L) 3.5 - 5.3 g/dL    Total Bilirubin 0.44 0.20 - 1.40 mg/dL    eGFR 125 >60 mL/min/1.73m*2    Corrected Calcium 7.2 (L) 8.6 - 10.3 mg/dL       Assessment/Plan   IUP at 32w0d.   Active Problems:    31 weeks gestation of pregnancy    Placental abruption in third trimester    Antepartum fetal bradycardia affecting care of mother     labor in third trimester without delivery        Plan:  Will decrease MgSO4 to 2 gm per hour.  If patient continues with < 4 contractions per hour today, consider discontinuing tomorrow.  Clear liquids.  Nexium and GI cocktail for reflux.    _______________________________  Chalo MD Darryn  08/31/21 9:35 AM

## 2021-08-31 NOTE — PLAN OF CARE
Problem: Pain - Adult  Goal: Verbalizes/displays adequate comfort level or baseline comfort level  Description: INTERVENTIONS:  1. Encourage patient to monitor pain and request interventions  2. Assess pain using the appropriate pain scale  3. Administer analgesics based on type and severity of pain and evaluate response  4. Educate/Implement non-pharmacological measures as appropriate and evaluate response  5. Consider cultural, developmental and social influences on pain and pain management  6. Notify Provider if interventions unsuccessful or patient reports new pain  8/31/2021 0907 by Khushboo Hall RN  Outcome: Progressing  Flowsheets (Taken 8/31/2021 0907)  Verbalizes/displays adequate comfort level or baseline comfort level:   Encourage patient to monitor pain and request interventions   Assess pain using the appropriate pain scale   Administer analgesics based on type and severity of pain and evaluate response   Educate/Implement non-pharmacological measures as appropriate and evaluate response   Consider cultural, developmental and social influences on pain and pain management   Notify Provider if interventions unsuccessful or patient reports new pain  8/31/2021 0906 by Khushboo Hall RN  Outcome: Progressing     Problem: Infection - Adult  Goal: Absence of infection during hospitalization  Description: INTERVENTIONS:  1. Assess and monitor for signs and symptoms of infection  2. Monitor lab/diagnostic results  3. Monitor all insertion sites/wounds/incisions  4. Monitor secretions for changes in amount and color  5. Administer medications as ordered  6. Educate and encourage patient and family to use good hand hygiene technique  7. Identify and educate in appropriate isolation precautions for identified infection/condition  8/31/2021 0907 by Khushboo Hall RN  Outcome: Progressing  Flowsheets (Taken 8/31/2021 0907)  Absence of infection during hospitalization:   Assess and monitor for signs and  symptoms of infection   Monitor lab/diagnostic results   Monitor all insertion sites/wounds/incisions   Monitor secretions for changes in amount and colo   Administer medications as ordered   Educate and encourage patient and family to use good hand hygiene technique   Identify and educate in appropriate isolation precautions for identified infection/condition  8/31/2021 0906 by Khushboo Hall RN  Outcome: Progressing     Problem: Safety Adult - Fall  Goal: Free from fall injury  Description: INTERVENTIONS:    Inpatient - Please reference Cares/Safety Flowsheet under [place] Fall Risk for interventions.  Pediatrics - Please reference Peds Daily Cares/Safety Flowsheet under [place] Pediatric Fall Assessment Fall Bundle for interventions  LD/OB - Please reference OB Shift Screening Flowsheet under OB Fall Risk for interventions.  8/31/2021 0907 by Khushboo Hall RN  Outcome: Progressing  8/31/2021 0906 by Khushboo Hall RN  Outcome: Progressing     Problem: Discharge Planning  Goal: Discharge to home or other facility with appropriate resources  Description: INTERVENTIONS:  1. Identify and discuss barriers to discharge with patient and caregiver.  2. Arrange for needed discharge resources and transportation as appropriate.  3. Identify discharge learning needs (meds, wound care, etc).  4. Arrange for interpreters to assist at discharge as needed.  5. Refer to  for coordinating discharge planning if the patient needs post-hospital services based on physician order or complex needs related to functional status, cognitive ability or social support system.  8/31/2021 0907 by Khushboo Hall RN  Outcome: Progressing  Flowsheets (Taken 8/31/2021 0907)  Discharge to home or other facility with appropriate resources: Identify and discuss barriers to discharge with patient and caregiver  8/31/2021 0906 by Khushboo Hall RN  Outcome: Progressing     Problem: Antepartum  Goal: Maintain pregnancy as long as  maternal and/or fetal condition is stable  Description: INTERVENTIONS:  1. Maternal surveillance  2. Fetal surveillance  3. Monitor uterine activity  4. Medications as ordered  5. Bedrest  8/31/2021 0907 by Khushboo Hall RN  Outcome: Progressing  Flowsheets (Taken 8/31/2021 0907)  Maintain pregnancy as long as maternal and/or fetal condition is stable:   Maternal surveillance   Fetal surveillance   Monitor uterine activity   Medications as ordered   Bedrest  8/31/2021 0906 by Khushboo Hall RN  Outcome: Progressing

## 2021-08-31 NOTE — NURSING END OF SHIFT
Nursing End of Shift Summary    Goals:  Clinical Goals for the Shift: Maintain pregnancy, no bleeding    Narrative Summary of Progress Towards Clinical Goals:    Barriers for Transfer or Discharge: no  SBAR to ZAY Harry RN to assume care of pt.

## 2021-08-31 NOTE — PLAN OF CARE
Problem: Pain - Adult  Goal: Verbalizes/displays adequate comfort level or baseline comfort level  Description: INTERVENTIONS:  1. Encourage patient to monitor pain and request interventions  2. Assess pain using the appropriate pain scale  3. Administer analgesics based on type and severity of pain and evaluate response  4. Educate/Implement non-pharmacological measures as appropriate and evaluate response  5. Consider cultural, developmental and social influences on pain and pain management  6. Notify Provider if interventions unsuccessful or patient reports new pain  Outcome: Progressing  Flowsheets (Taken 8/30/2021 1921)  Verbalizes/displays adequate comfort level or baseline comfort level:   Encourage patient to monitor pain and request interventions   Assess pain using the appropriate pain scale   Administer analgesics based on type and severity of pain and evaluate response   Educate/Implement non-pharmacological measures as appropriate and evaluate response   Consider cultural, developmental and social influences on pain and pain management   Notify Provider if interventions unsuccessful or patient reports new pain     Problem: Infection - Adult  Goal: Absence of infection during hospitalization  Description: INTERVENTIONS:  1. Assess and monitor for signs and symptoms of infection  2. Monitor lab/diagnostic results  3. Monitor all insertion sites/wounds/incisions  4. Monitor secretions for changes in amount and color  5. Administer medications as ordered  6. Educate and encourage patient and family to use good hand hygiene technique  7. Identify and educate in appropriate isolation precautions for identified infection/condition  Outcome: Progressing  Flowsheets (Taken 8/30/2021 1921)  Absence of infection during hospitalization:   Assess and monitor for signs and symptoms of infection   Monitor all insertion sites/wounds/incisions   Monitor lab/diagnostic results   Monitor secretions for changes in amount  and colo   Administer medications as ordered   Educate and encourage patient and family to use good hand hygiene technique     Problem: Safety Adult - Fall  Goal: Free from fall injury  Description: INTERVENTIONS:    Inpatient - Please reference Cares/Safety Flowsheet under Fair Fall Risk for interventions.  Pediatrics - Please reference Peds Daily Cares/Safety Flowsheet under [facility] Pediatric Fall Assessment Fall Bundle for interventions  LD/OB - Please reference OB Shift Screening Flowsheet under OB Fall Risk for interventions.  Outcome: Progressing  Note: Oriented to room. Call light within reach.     Problem: Discharge Planning  Goal: Discharge to home or other facility with appropriate resources  Description: INTERVENTIONS:  1. Identify and discuss barriers to discharge with patient and caregiver.  2. Arrange for needed discharge resources and transportation as appropriate.  3. Identify discharge learning needs (meds, wound care, etc).  4. Arrange for interpreters to assist at discharge as needed.  5. Refer to  for coordinating discharge planning if the patient needs post-hospital services based on physician order or complex needs related to functional status, cognitive ability or social support system.  Outcome: Progressing  Flowsheets (Taken 8/30/2021 1921)  Discharge to home or other facility with appropriate resources: Identify and discuss barriers to discharge with patient and caregiver     Problem: Antepartum  Goal: Maintain pregnancy as long as maternal and/or fetal condition is stable  Description: INTERVENTIONS:  1. Maternal surveillance  2. Fetal surveillance  3. Monitor uterine activity  4. Medications as ordered  5. Bedrest  Outcome: Progressing  Flowsheets (Taken 8/30/2021 1921)  Maintain pregnancy as long as maternal and/or fetal condition is stable:   Maternal surveillance   Fetal surveillance   Medications as ordered   Monitor uterine activity   Bedrest

## 2021-08-31 NOTE — CROSS COVER NOTE
"Pt is sitting up in chair, reports feeling \"OK\". When asked about heartburn and nausea reiterates that she \"feels OK\". Weak/heavy feeling in arms and legs feels mildly improved. States she continues to get up to urinate frequent. HA resolved at this time. Denies chest pain, palpitations, shortness of breath, or visual changes. Denies feeling ctx      EFM: baseline 130, moderate variability, +accels, rare variable decel  [place]: irregular ctx, 1-2 per hour    General: alert, sitting comfortably in chair, no acute distress  CV: RRR no murmurs, rubs or gallops.   Resp: CTA bilaterally no wheezing or rhonchi.   MSK: strength 4/5 upper and lower extremities bilaterally  Neuro: DTRs 3+ bilaterally, patellar, Achilles and biceps bilaterally.   Skin: warm and dry, cap refills 2-3 seconds      Angelique Dodd,   Family Medicine PGY1  8/31/2021 1343  "

## 2021-08-31 NOTE — CROSS COVER NOTE
Pt laying comfortably in bed at time of assessment. Heart burn and nausea improved. Weak/heavy feeling in arms/legs continues to improve. Continues to urinate frequently. Denies chest pain, palpitations, SOB, HA or visual changes. Denies feeling contractions.     EFM: baseline 125, moderate variability, + accels, rare variable decel.   [place]: 3 ctx in the last hour    General:   CV: RRR, no murmur, rubs, gallops  Resp: CT bilaterally, no wheezing or rhonchi  Neuro: DTRs 3+ bilaterally, patellar and achilles  Skin: warm and dry, cap refill 2-3 seconds    Agnelique Dodd DO  Family Medicine PGY1  8/31/2021 0470

## 2021-08-31 NOTE — NURSING NOTE
"0722 RN in room to assess pt. Pt states she is having cramping in her legs along with some back pain. Pt states \"it feels like I haven't drank enough water\". RN assess legs, warm dry and intact. No redness or hot areas palpated. SCD have been on and remain on. RN discusses back pain with pt, she states it is not feeling UCs. Due to heart burn, pt is not drinking water because she states \"when I drink water, I vomit\". Pt states her last meal was Saturday evening. Pt is wondering if food would help. RN will discuss with the Residents and Dr. Cates about diet and also switching to Nexium instead of Pepcid. PT agrees with RN and has no other questions at this time. Call light within reach.     0758 RN gives Dr. Dodd an update about pt complaints    0758 U/S at bedside    0808 Dr. Dodd updating Dr. Cates via phone, verbal orders received.     Pt able to rest throughout the day on and off. UCs not felt by patient, none palpated by RN.     1725 Dr. Cates at nurses station, reiterate pt hunger and lack of heart burn and nausea. Pt given chicken noodle soup.     1800 RN in room to reassess. Pt stated \"this is just the best soup I've ever had\". Pt not feeling nauseous.     1810 Dr. Hosler update Dr. Cates about pt tolerance of soup. Pt can have soup throuought the night, just nothing solid.      "

## 2021-08-31 NOTE — CROSS COVER NOTE
"Pt is sitting comfortably in bed, just finished her GI cocktail. She reports that her legs feel \"tired and weak\" when she is up walking to the bathroom. Also reports that her phone feels heavier to  than normal. States she is urinating \"all the time\". C/o intermittent HA but feels this might be due to not drinking much water. Denies chest pain, palpitations, shortness of breath, or visual changes.       EFM: baseline 125, moderate variability, +accels, no decels  [place]: irregular ctx, 1-2/hour    General: alert and interactive, no acute distress  CV: RRR no murmurs, rubs or gallops.   Resp: CTA bilaterally no wheezing or rhonci.   MSK: strength 4/5 upper and lower extremities bilaterally  Neuro: DTRs 3+ bilaterally, patellar, Achilles and biceps bilaterally.   Skin: warm and dry, cap refills 2-3 seconds      Angelique Dodd,   Family Medicine PGY1  8/31/2021 1035  "

## 2021-08-31 NOTE — CROSS COVER NOTE
Pt resting comfortably at time of assessment. Is not feeling any contractions.     EFM: baseline 120, moderate variability, + accels, no decels - remains category 1  [place]: not ctx appreciated    CV: RRR, no murmurs, rubs, gallops  Resp: lungs CTA bilaterally, no wheeze or rhonchi  Neuro: DTRs 3+ bilaterally, patellar and Achilles.    Angelique Dodd   [facility], PGY1  5330 08/30/2021

## 2021-08-31 NOTE — NURSING NOTE
"0300: RN to room. Pt sitting up straight in bed, bracing with her arm. Asked if pt was okay. Pt did not respond. Asked if pt was in pain. Pt stated \"my chest hurts\". Magnesium assessment performed and WNL. Asked pt if she felt a heaviness on her chest or sharpness. Pt responded \"it's just my heartburn\". Discussed options for heartburn. Pt would like Zofran. Zofran ordered via MAR from Pharmacy. Monitor adjusted.    0316: RN to room to adjust monitor. Pt repositioned self and monitor capturing.    0327: RN to room. Pt sitting straight up in bed again, body positioned to the side towards SO. Discussed the fetal monitor not capturing. No response from pt. Monitor now capturing. Discussed that Pharmacy messaged and were preparing Zofran. No acknowledgement from pt. Asked if RN could do anything else for pt. Again, no acknowledgement from pt. Plan of care reviewed and RN to desk.    0358: RN to room with Zofran. Administer via MAR. SO sitting at bedside holding hand and providing support. Denies further needs. Pt continues to not respond to RN. Call light within reach.  Farhana Harry, RN    "

## 2021-09-01 LAB
ALBUMIN SERPL-MCNC: 3.1 G/DL (ref 3.5–5.3)
ALP SERPL-CCNC: 76 U/L (ref 37–98)
ALT SERPL-CCNC: 16 U/L (ref 7–52)
ANION GAP SERPL CALC-SCNC: 8 MMOL/L (ref 3–11)
AST SERPL-CCNC: 17 U/L
BASOPHILS # BLD AUTO: 0 10*3/UL
BASOPHILS NFR BLD AUTO: 0 % (ref 0–2)
BILIRUB SERPL-MCNC: 0.42 MG/DL (ref 0.2–1.4)
BUN SERPL-MCNC: 6 MG/DL (ref 7–25)
CALCIUM ALBUM COR SERPL-MCNC: 7.7 MG/DL (ref 8.6–10.3)
CALCIUM SERPL-MCNC: 7 MG/DL (ref 8.6–10.3)
CHLORIDE SERPL-SCNC: 104 MMOL/L (ref 98–107)
CO2 SERPL-SCNC: 26 MMOL/L (ref 21–32)
CREAT SERPL-MCNC: 0.47 MG/DL (ref 0.6–1.1)
EOSINOPHIL # BLD AUTO: 0 10*3/UL
EOSINOPHIL NFR BLD AUTO: 0 % (ref 0–3)
ERYTHROCYTE [DISTWIDTH] IN BLOOD BY AUTOMATED COUNT: 11.6 % (ref 11.5–14)
GFR SERPL CREATININE-BSD FRML MDRD: 130 ML/MIN/1.73M*2
GLUCOSE SERPL-MCNC: 101 MG/DL (ref 70–105)
HCT VFR BLD AUTO: 27.9 % (ref 34–45)
HGB BLD-MCNC: 9.9 G/DL (ref 11.5–15.5)
LYMPHOCYTES # BLD AUTO: 1.4 10*3/UL
LYMPHOCYTES NFR BLD AUTO: 15 % (ref 11–47)
MCH RBC QN AUTO: 31.2 PG (ref 28–33)
MCHC RBC AUTO-ENTMCNC: 35.4 G/DL (ref 32–36)
MCV RBC AUTO: 88 FL (ref 81–97)
MONOCYTES # BLD AUTO: 0.6 10*3/UL
MONOCYTES NFR BLD AUTO: 7 % (ref 3–11)
NEUTROPHILS # BLD AUTO: 7.3 10*3/UL
NEUTROPHILS NFR BLD AUTO: 78 % (ref 41–81)
PLATELET # BLD AUTO: 237 10*3/UL (ref 140–350)
PMV BLD AUTO: 8.2 FL (ref 6.9–10.8)
POTASSIUM SERPL-SCNC: 3.1 MMOL/L (ref 3.5–5.1)
PROT SERPL-MCNC: 5.8 G/DL (ref 6–8.3)
RBC # BLD AUTO: 3.17 10*6/ΜL (ref 3.7–5.3)
SODIUM SERPL-SCNC: 138 MMOL/L (ref 135–145)
WBC # BLD AUTO: 9.3 10*3/UL (ref 4.5–10.5)

## 2021-09-01 PROCEDURE — 80053 COMPREHEN METABOLIC PANEL: CPT

## 2021-09-01 PROCEDURE — (BLANK) HC ROOM PRIVATE OBSTETRICS

## 2021-09-01 PROCEDURE — 2580000300 HC RX 258: Performed by: OBSTETRICS & GYNECOLOGY

## 2021-09-01 PROCEDURE — 6360000200 HC RX 636 W HCPCS (ALT 250 FOR IP)

## 2021-09-01 PROCEDURE — 6370000100 HC RX 637 (ALT 250 FOR IP): Performed by: OBSTETRICS & GYNECOLOGY

## 2021-09-01 PROCEDURE — 6360000200 HC RX 636 W HCPCS (ALT 250 FOR IP): Performed by: OBSTETRICS & GYNECOLOGY

## 2021-09-01 PROCEDURE — 85025 COMPLETE CBC W/AUTO DIFF WBC: CPT

## 2021-09-01 RX ORDER — ADHESIVE BANDAGE
30 BANDAGE TOPICAL DAILY PRN
Status: DISCONTINUED | OUTPATIENT
Start: 2021-09-01 | End: 2021-09-02 | Stop reason: HOSPADM

## 2021-09-01 RX ORDER — POTASSIUM CHLORIDE 750 MG/1
40 TABLET, FILM COATED, EXTENDED RELEASE ORAL 2 TIMES DAILY WITH MEALS
Status: DISCONTINUED | OUTPATIENT
Start: 2021-09-01 | End: 2021-09-02 | Stop reason: HOSPADM

## 2021-09-01 RX ORDER — NIFEDIPINE 20 MG/1
20 CAPSULE ORAL EVERY 6 HOURS
Status: DISCONTINUED | OUTPATIENT
Start: 2021-09-01 | End: 2021-09-02 | Stop reason: HOSPADM

## 2021-09-01 RX ADMIN — POTASSIUM CHLORIDE 40 MEQ: 750 TABLET, FILM COATED, EXTENDED RELEASE ORAL at 18:13

## 2021-09-01 RX ADMIN — ESOMEPRAZOLE SODIUM 40 MG: 40 INJECTION, POWDER, LYOPHILIZED, FOR SOLUTION INTRAVENOUS at 07:53

## 2021-09-01 RX ADMIN — SODIUM CHLORIDE, POTASSIUM CHLORIDE, SODIUM LACTATE AND CALCIUM CHLORIDE 75 ML/HR: 600; 310; 30; 20 INJECTION, SOLUTION INTRAVENOUS at 05:54

## 2021-09-01 RX ADMIN — MAGNESIUM HYDROXIDE 30 ML: 400 SUSPENSION ORAL at 12:02

## 2021-09-01 RX ADMIN — MAGNESIUM SULFATE IN WATER 2 G/HR: 40 INJECTION, SOLUTION INTRAVENOUS at 00:05

## 2021-09-01 RX ADMIN — NIFEDIPINE 20 MG: 20 CAPSULE ORAL at 07:50

## 2021-09-01 RX ADMIN — NIFEDIPINE 20 MG: 20 CAPSULE ORAL at 20:17

## 2021-09-01 RX ADMIN — POTASSIUM CHLORIDE 40 MEQ: 750 TABLET, FILM COATED, EXTENDED RELEASE ORAL at 10:58

## 2021-09-01 RX ADMIN — NIFEDIPINE 20 MG: 20 CAPSULE ORAL at 15:09

## 2021-09-01 NOTE — PROGRESS NOTES
"Progress Note    Date of Service:   2021    Viraj Mc is a 35 y.o.  at 32w1d gestational age. Estimated Date of Delivery: 10/26/21.  Patient diet advanced after stopping MgSO4 this morning.  Nifedipine 20 mg po q6h started.  Hypokalemia noted.      Objective     Intake/Output Summary (Last 24 hours) at 2021 1125  Last data filed at 2021 0732  Gross per 24 hour   Intake 3435.57 ml   Output 2825 ml   Net 610.57 ml       Current Facility-Administered Medications:   •  NIFEdipine (PROCARDIA) capsule 20 mg, 20 mg, oral, q6h, Chalo Cates MD, 20 mg at 21 0750  •  potassium chloride (KLOR-CON) CR tablet 40 mEq, 40 mEq, oral, 2x daily with meals, Chalo Cates MD, 40 mEq at 21 1058  •  esomeprazole (NexIUM) injection 40 mg, 40 mg, intravenous, Daily at 1600, Angelique Dodd DO, 40 mg at 21 0753  •  ondansetron (ZOFRAN) 8 mg in sodium chloride 0.9 % 50 mL IVPB, 8 mg, intravenous, q6h PRN, Chalo Cates MD, Stopped at 21 0411  •  acetaminophen (TYLENOL) tablet 500-1,000 mg, 500-1,000 mg, oral, q6h PRN, Chalo Cates MD, 500 mg at 21 2303  •  LR infusion, 75 mL/hr, intravenous, Continuous, Chalo Cates MD, Last Rate: 75 mL/hr at 21 0554, 75 mL/hr at 21 0554    Physical Exam  BP 92/51   Pulse 81   Temp 36.2 °C (97.2 °F) (Temporal)   Resp 20   Ht 1.626 m (5' 4\")   Wt 74.4 kg (164 lb 0.4 oz)   SpO2 93%   BMI 28.15 kg/m²     General:   Alert, oriented, no acute distress   Skin: Normal   Lungs:  Clear to auscultation bilaterally   Heart:  Regular rate and rhythm   Abdomen:  Gravid, non-tender.   Extremities:  Negative Elan's bilaterally. Patellar DTRs 2+. Edema none   Pelvis/Cervix:  Normal external female genitalia.  Cervix deferred.       Uterine Size:  size equals dates     Cervical Exam  Dilation: Closed (See notes)  Cervical Consistency: Soft  Cervical Position: Posterior  Method: Manual  OB Examiner: Sully Cobb RN    NST     Fetal Heart " Rate  Mode: External US  Baseline Rate: 135 bpm  Baseline Classification: Normal (110-160)  Variability: Moderate (Between 6 and 25 BPM)  Accelerations: Present  Decelerations: Absent  Tracing Observations: tracing picking up moms heart rate from 0404 -0414 patient sitting up throwing up-  FHR Category: Category I  Multiple Births: No  Uterine Activity  Mode: [place]  Contraction Regularity: Occasional  Contraction Frequency (minutes): 3 in an hour  Contraction Duration (seconds): 40-70  Contraction Quality: Mild  Resting Tone Palpated: Soft          Recent Results (from the past 24 hour(s))   CBC w/auto differential Blood, Venous    Collection Time: 09/01/21  7:36 AM   Result Value Ref Range    WBC 9.3 4.5 - 10.5 10*3/uL    RBC 3.17 (L) 3.70 - 5.30 10*6/µL    Hemoglobin 9.9 (L) 11.5 - 15.5 g/dL    Hematocrit 27.9 (L) 34.0 - 45.0 %    MCV 88.0 81.0 - 97.0 fL    MCH 31.2 28.0 - 33.0 pg    MCHC 35.4 32.0 - 36.0 g/dL    RDW 11.6 11.5 - 14.0 %    Platelets 237 140 - 350 10*3/uL    MPV 8.2 6.9 - 10.8 fL    Neutrophils% 78 41 - 81 %    Lymphocytes% 15 11 - 47 %    Monocytes% 7 3 - 11 %    Eosinophils% 0 0 - 3 %    Basophils% 0 0 - 2 %    ANC (auto diff) 7.30 10*3/UL    Lymphocytes Absolute 1.40 10*3/uL    Monocytes Absolute 0.60 10*3/uL    Eosinophils Absolute 0.00 10*3/uL    Basophils Absolute 0.00 10*3/uL   Comprehensive metabolic panel Blood, Venous    Collection Time: 09/01/21  7:36 AM   Result Value Ref Range    Sodium 138 135 - 145 mmol/L    Potassium 3.1 (L) 3.5 - 5.1 mmol/L    Chloride 104 98 - 107 mmol/L    CO2 26 21 - 32 mmol/L    Anion Gap 8 3 - 11 mmol/L    BUN 6 (L) 7 - 25 mg/dL    Creatinine 0.47 (L) 0.60 - 1.10 mg/dL    Glucose 101 70 - 105 mg/dL    Calcium 7.0 (L) 8.6 - 10.3 mg/dL    AST 17 <40 U/L    ALT (SGPT) 16 7 - 52 U/L    Alkaline Phosphatase 76 37 - 98 U/L    Total Protein 5.8 (L) 6.0 - 8.3 g/dL    Albumin 3.1 (L) 3.5 - 5.3 g/dL    Total Bilirubin 0.42 0.20 - 1.40 mg/dL    eGFR 130 >60 mL/min/1.73m*2     Corrected Calcium 7.7 (L) 8.6 - 10.3 mg/dL       Assessment/Plan   IUP at 32w1d.   Active Problems:    31 weeks gestation of pregnancy    Placental abruption in third trimester    Antepartum fetal bradycardia affecting care of mother     labor in third trimester without delivery        Plan: will attempt control of tocolysis with Procardia 20 mg q6h.  If minimal uterine activity, consider discharge tomorrow.  Travel precautions and  labor precautions discussed.  Patient plans to f/u with her Ob/Gyn when she returns to [place].    _______________________________  Chalo Cates MD  21 11:25 AM

## 2021-09-01 NOTE — CROSS COVER NOTE
Pt was sitting up comfortably in bed after transferring rooms. She stated she felt like she was back to normal and wondered when she would be able to go home.  She denied any chest pain, shortness of breath, nausea, vomiting, or abdominal pain.  She states that the heaviness in her arms and legs was much better.  She tolerated eating. Denies vaginal bleeding.  She is voiding and stooling appropriately.  She has no concerns.      EFM: baseline 140s, good variability, occassional accels, rare decels  [place]: irregular ctx, 1-2 hour    General: alert, sitting comfortably in bed, no acute distress  CV: RRR, normal s1 and s2, no murmurs, rubs or gallops.   Resp: CTA bilaterally no wheezing or rhonchi.   MSK: strength 5/5 upper and lower extremities bilaterally  Skin: warm and dry, cap refills 2-3 seconds     Arnoldo Corado DO  8:29 AM  9/01/2021

## 2021-09-01 NOTE — NURSING END OF SHIFT
Nursing End of Shift Summary    Goals:  Clinical Goals for the Shift: Maintain pregnancy    Narrative Summary of Progress Towards Clinical Goals:  Pt has not felt contractions today and no bleeding. Heartburn resolved with Nexium once daily. 40 mEq of potassium given. Pt now able to eat soup and crackers. Pt very appreciative of this.     Barriers for Transfer or Discharge: none    SBAR to Enedelia Rose RN

## 2021-09-01 NOTE — PLAN OF CARE
Problem: Pain - Adult  Goal: Verbalizes/displays adequate comfort level or baseline comfort level  Description: INTERVENTIONS:  1. Encourage patient to monitor pain and request interventions  2. Assess pain using the appropriate pain scale  3. Administer analgesics based on type and severity of pain and evaluate response  4. Educate/Implement non-pharmacological measures as appropriate and evaluate response  5. Consider cultural, developmental and social influences on pain and pain management  6. Notify Provider if interventions unsuccessful or patient reports new pain  Outcome: Progressing  Flowsheets (Taken 8/31/2021 1943)  Verbalizes/displays adequate comfort level or baseline comfort level:   Encourage patient to monitor pain and request interventions   Assess pain using the appropriate pain scale   Administer analgesics based on type and severity of pain and evaluate response   Educate/Implement non-pharmacological measures as appropriate and evaluate response   Consider cultural, developmental and social influences on pain and pain management     Problem: Infection - Adult  Goal: Absence of infection during hospitalization  Description: INTERVENTIONS:  1. Assess and monitor for signs and symptoms of infection  2. Monitor lab/diagnostic results  3. Monitor all insertion sites/wounds/incisions  4. Monitor secretions for changes in amount and color  5. Administer medications as ordered  6. Educate and encourage patient and family to use good hand hygiene technique  7. Identify and educate in appropriate isolation precautions for identified infection/condition  Outcome: Progressing  Flowsheets (Taken 8/31/2021 1943)  Absence of infection during hospitalization:   Monitor lab/diagnostic results   Administer medications as ordered     Problem: Safety Adult - Fall  Goal: Free from fall injury  Description: INTERVENTIONS:    Inpatient - Please reference Cares/Safety Flowsheet under [place] Fall Risk for  interventions.  Pediatrics - Please reference Peds Daily Cares/Safety Flowsheet under [place] Pediatric Fall Assessment Fall Bundle for interventions  LD/OB - Please reference OB Shift Screening Flowsheet under OB Fall Risk for interventions.  Outcome: Progressing     Problem: Discharge Planning  Goal: Discharge to home or other facility with appropriate resources  Description: INTERVENTIONS:  1. Identify and discuss barriers to discharge with patient and caregiver.  2. Arrange for needed discharge resources and transportation as appropriate.  3. Identify discharge learning needs (meds, wound care, etc).  4. Arrange for interpreters to assist at discharge as needed.  5. Refer to  for coordinating discharge planning if the patient needs post-hospital services based on physician order or complex needs related to functional status, cognitive ability or social support system.  Outcome: Progressing  Flowsheets (Taken 8/31/2021 1943)  Discharge to home or other facility with appropriate resources: Identify discharge learning needs (meds, wound care, etc)     Problem: Antepartum  Goal: Maintain pregnancy as long as maternal and/or fetal condition is stable  Description: INTERVENTIONS:  1. Maternal surveillance  2. Fetal surveillance  3. Monitor uterine activity  4. Medications as ordered  5. Bedrest  Outcome: Progressing  Flowsheets (Taken 8/31/2021 1943)  Maintain pregnancy as long as maternal and/or fetal condition is stable:   Maternal surveillance   Fetal surveillance   Monitor uterine activity   Medications as ordered   Bedrest

## 2021-09-01 NOTE — PLAN OF CARE
Problem: Pain - Adult  Goal: Verbalizes/displays adequate comfort level or baseline comfort level  Description: INTERVENTIONS:  1. Encourage patient to monitor pain and request interventions  2. Assess pain using the appropriate pain scale  3. Administer analgesics based on type and severity of pain and evaluate response  4. Educate/Implement non-pharmacological measures as appropriate and evaluate response  5. Consider cultural, developmental and social influences on pain and pain management  6. Notify Provider if interventions unsuccessful or patient reports new pain  Outcome: Progressing  Flowsheets (Taken 9/1/2021 0759)  Verbalizes/displays adequate comfort level or baseline comfort level:   Encourage patient to monitor pain and request interventions   Assess pain using the appropriate pain scale   Administer analgesics based on type and severity of pain and evaluate response   Educate/Implement non-pharmacological measures as appropriate and evaluate response     Problem: Infection - Adult  Goal: Absence of infection during hospitalization  Description: INTERVENTIONS:  1. Assess and monitor for signs and symptoms of infection  2. Monitor lab/diagnostic results  3. Monitor all insertion sites/wounds/incisions  4. Monitor secretions for changes in amount and color  5. Administer medications as ordered  6. Educate and encourage patient and family to use good hand hygiene technique  7. Identify and educate in appropriate isolation precautions for identified infection/condition  Outcome: Progressing  Flowsheets (Taken 9/1/2021 9434)  Absence of infection during hospitalization:   Assess and monitor for signs and symptoms of infection   Monitor lab/diagnostic results   Monitor all insertion sites/wounds/incisions   Monitor secretions for changes in amount and colo   Administer medications as ordered     Problem: Safety Adult - Fall  Goal: Free from fall injury  Description: INTERVENTIONS:    Inpatient - Please  reference Cares/Safety Flowsheet under [place] Fall Risk for interventions.  Pediatrics - Please reference Peds Daily Cares/Safety Flowsheet under [place] Pediatric Fall Assessment Fall Bundle for interventions  LD/OB - Please reference OB Shift Screening Flowsheet under OB Fall Risk for interventions.  Outcome: Progressing     Problem: Discharge Planning  Goal: Discharge to home or other facility with appropriate resources  Description: INTERVENTIONS:  1. Identify and discuss barriers to discharge with patient and caregiver.  2. Arrange for needed discharge resources and transportation as appropriate.  3. Identify discharge learning needs (meds, wound care, etc).  4. Arrange for interpreters to assist at discharge as needed.  5. Refer to  for coordinating discharge planning if the patient needs post-hospital services based on physician order or complex needs related to functional status, cognitive ability or social support system.  Outcome: Progressing  Flowsheets (Taken 9/1/2021 0759)  Discharge to home or other facility with appropriate resources: Identify and discuss barriers to discharge with patient and caregiver     Problem: Antepartum  Goal: Maintain pregnancy as long as maternal and/or fetal condition is stable  Description: INTERVENTIONS:  1. Maternal surveillance  2. Fetal surveillance  3. Monitor uterine activity  4. Medications as ordered  5. Bedrest  Outcome: Progressing  Flowsheets (Taken 9/1/2021 0759)  Maintain pregnancy as long as maternal and/or fetal condition is stable:   Maternal surveillance   Fetal surveillance   Monitor uterine activity   Medications as ordered   Bedrest

## 2021-09-02 VITALS
HEART RATE: 76 BPM | HEIGHT: 64 IN | TEMPERATURE: 98.2 F | SYSTOLIC BLOOD PRESSURE: 105 MMHG | OXYGEN SATURATION: 97 % | DIASTOLIC BLOOD PRESSURE: 64 MMHG | BODY MASS INDEX: 28 KG/M2 | WEIGHT: 164.02 LBS | RESPIRATION RATE: 16 BRPM

## 2021-09-02 LAB
ALBUMIN SERPL-MCNC: 3.2 G/DL (ref 3.5–5.3)
ALP SERPL-CCNC: 75 U/L (ref 37–98)
ALT SERPL-CCNC: 28 U/L (ref 7–52)
ANION GAP SERPL CALC-SCNC: 8 MMOL/L (ref 3–11)
AST SERPL-CCNC: 27 U/L
BASOPHILS # BLD AUTO: 0.1 10*3/UL
BASOPHILS NFR BLD AUTO: 1 % (ref 0–2)
BILIRUB SERPL-MCNC: 0.45 MG/DL (ref 0.2–1.4)
BUN SERPL-MCNC: 9 MG/DL (ref 7–25)
CALCIUM ALBUM COR SERPL-MCNC: 9.2 MG/DL (ref 8.6–10.3)
CALCIUM SERPL-MCNC: 8.6 MG/DL (ref 8.6–10.3)
CHLORIDE SERPL-SCNC: 105 MMOL/L (ref 98–107)
CO2 SERPL-SCNC: 23 MMOL/L (ref 21–32)
CREAT SERPL-MCNC: 0.45 MG/DL (ref 0.6–1.1)
EOSINOPHIL # BLD AUTO: 0 10*3/UL
EOSINOPHIL NFR BLD AUTO: 1 % (ref 0–3)
ERYTHROCYTE [DISTWIDTH] IN BLOOD BY AUTOMATED COUNT: 11.7 % (ref 11.5–14)
GFR SERPL CREATININE-BSD FRML MDRD: 132 ML/MIN/1.73M*2
GLUCOSE SERPL-MCNC: 90 MG/DL (ref 70–105)
HCT VFR BLD AUTO: 29.4 % (ref 34–45)
HGB BLD-MCNC: 10.3 G/DL (ref 11.5–15.5)
LYMPHOCYTES # BLD AUTO: 1.6 10*3/UL
LYMPHOCYTES NFR BLD AUTO: 20 % (ref 11–47)
MCH RBC QN AUTO: 30.7 PG (ref 28–33)
MCHC RBC AUTO-ENTMCNC: 34.9 G/DL (ref 32–36)
MCV RBC AUTO: 88 FL (ref 81–97)
MONOCYTES # BLD AUTO: 0.7 10*3/UL
MONOCYTES NFR BLD AUTO: 9 % (ref 3–11)
NEUTROPHILS # BLD AUTO: 5.4 10*3/UL
NEUTROPHILS NFR BLD AUTO: 69 % (ref 41–81)
PLATELET # BLD AUTO: 237 10*3/UL (ref 140–350)
PMV BLD AUTO: 8.3 FL (ref 6.9–10.8)
POTASSIUM SERPL-SCNC: 3.5 MMOL/L (ref 3.5–5.1)
PROT SERPL-MCNC: 6.1 G/DL (ref 6–8.3)
RBC # BLD AUTO: 3.34 10*6/ΜL (ref 3.7–5.3)
SODIUM SERPL-SCNC: 136 MMOL/L (ref 135–145)
WBC # BLD AUTO: 7.9 10*3/UL (ref 4.5–10.5)

## 2021-09-02 PROCEDURE — 80053 COMPREHEN METABOLIC PANEL: CPT | Performed by: OBSTETRICS & GYNECOLOGY

## 2021-09-02 PROCEDURE — 85025 COMPLETE CBC W/AUTO DIFF WBC: CPT | Performed by: OBSTETRICS & GYNECOLOGY

## 2021-09-02 PROCEDURE — 6370000100 HC RX 637 (ALT 250 FOR IP): Performed by: OBSTETRICS & GYNECOLOGY

## 2021-09-02 RX ORDER — NIFEDIPINE 20 MG/1
20 CAPSULE ORAL EVERY 6 HOURS
Qty: 28 CAPSULE | Refills: 0 | Status: SHIPPED | OUTPATIENT
Start: 2021-09-02 | End: 2021-09-16

## 2021-09-02 RX ADMIN — POTASSIUM CHLORIDE 40 MEQ: 750 TABLET, FILM COATED, EXTENDED RELEASE ORAL at 08:59

## 2021-09-02 RX ADMIN — NIFEDIPINE 20 MG: 20 CAPSULE ORAL at 08:59

## 2021-09-02 RX ADMIN — NIFEDIPINE 20 MG: 20 CAPSULE ORAL at 01:40

## 2021-09-02 NOTE — NURSING END OF SHIFT
Nursing End of Shift Summary    Goals:  Clinical Goals for the Shift: DC home today    Narrative Summary of Progress Towards Clinical Goals:  Pt continues to progress towards goals.     Barriers for Transfer or Discharge: no      SBAR to Luz Marina Yang RN to assume care of pt.   Virgie Kumar, RN

## 2021-09-02 NOTE — DISCHARGE SUMMARY
Ob Discharge Summary    BRIEF OVERVIEW  Admission Date: 2021     Discharge Date: 2021    Admission Diagnosis  Active Problems:    31 weeks gestation of pregnancy    Placental abruption in third trimester    Antepartum fetal bradycardia affecting care of mother     labor in third trimester without delivery      Discharge Diagnosis  Active Problems:    31 weeks gestation of pregnancy    Placental abruption in third trimester    Antepartum fetal bradycardia affecting care of mother     labor in third trimester without delivery      Procedures  1. Tocolysis with MgSO4  2.  Daily fetal nonstress test  3.  Betamethasone administered on 2021 and 2021    History of Present Illness  Please see H&P.      Subjective   Patient is doing well today.  Minimal uterine activity through the last day with occasional contraction noted.  Patient tolerated Procardia 20 mg p.o. every 6 hours.  The patient's hypokalemia has resolved with oral potassium.    Objective   Temp:  [36.8 °C (98.2 °F)-37.6 °C (99.7 °F)] 36.8 °C (98.2 °F)  Heart Rate:  [73-93] 76  Resp:  [16-18] 16  BP: (100-105)/(51-64) 105/64  Physical Exam    General:  Alert, cooperative, no distress   Abdomen: Bowel sounds present.  Uterus is nontender to palpation and soft.   Pelvic Deferred   Extremities: Negative Elan's sign bilaterally.  Edema none.  DTRs normal.     Labs  Recent Results (from the past 24 hour(s))   CBC w/auto differential Blood, Venous    Collection Time: 21  7:13 AM   Result Value Ref Range    WBC 7.9 4.5 - 10.5 10*3/uL    RBC 3.34 (L) 3.70 - 5.30 10*6/µL    Hemoglobin 10.3 (L) 11.5 - 15.5 g/dL    Hematocrit 29.4 (L) 34.0 - 45.0 %    MCV 88.0 81.0 - 97.0 fL    MCH 30.7 28.0 - 33.0 pg    MCHC 34.9 32.0 - 36.0 g/dL    RDW 11.7 11.5 - 14.0 %    Platelets 237 140 - 350 10*3/uL    MPV 8.3 6.9 - 10.8 fL    Neutrophils% 69 41 - 81 %    Lymphocytes% 20 11 - 47 %    Monocytes% 9 3 - 11 %    Eosinophils% 1 0 - 3 %     Basophils% 1 0 - 2 %    ANC (auto diff) 5.40 10*3/UL    Lymphocytes Absolute 1.60 10*3/uL    Monocytes Absolute 0.70 10*3/uL    Eosinophils Absolute 0.00 10*3/uL    Basophils Absolute 0.10 10*3/uL   Comprehensive metabolic panel Blood, Venous    Collection Time: 09/02/21  7:13 AM   Result Value Ref Range    Sodium 136 135 - 145 mmol/L    Potassium 3.5 3.5 - 5.1 mmol/L    Chloride 105 98 - 107 mmol/L    CO2 23 21 - 32 mmol/L    Anion Gap 8 3 - 11 mmol/L    BUN 9 7 - 25 mg/dL    Creatinine 0.45 (L) 0.60 - 1.10 mg/dL    Glucose 90 70 - 105 mg/dL    Calcium 8.6 8.6 - 10.3 mg/dL    AST 27 <40 U/L    ALT (SGPT) 28 7 - 52 U/L    Alkaline Phosphatase 75 37 - 98 U/L    Total Protein 6.1 6.0 - 8.3 g/dL    Albumin 3.2 (L) 3.5 - 5.3 g/dL    Total Bilirubin 0.45 0.20 - 1.40 mg/dL    eGFR 132 >60 mL/min/1.73m*2    Corrected Calcium 9.2 8.6 - 10.3 mg/dL       Assessment/Plan       Diet   regular diet    Discharge Medications     Medication List      START taking these medications    NIFEdipine 20 mg capsule  Commonly known as: PROCARDIA  Take 1 capsule (20 mg total) by mouth every 6 (six) hours for 14 days           CONTINUE taking these medications    omeprazole 20 mg capsule  Commonly known as: PriLOSEC        Prenatal 28 mg iron- 800 mcg tablet  Generic drug: MVP-ferrous fumarate-FA                 Discharge Plan  1.  Pelvic rest  2.  Notify physician for increased frequency of contractions  3.  Use Procardia 20 mg every 6 hours  4.  Follow-up with primary care physician on 9/3/2021  5.  Proceed immediately to emergency room for vaginal bleeding  6.  Travel precautions discussed with patient and her partner.    Condition of Patient at Discharge  Stable    Time spent with patient/Coordination of care: 30 minutes  -----------------------------  Chalo Cates MD  09/02/21 8:50 AM

## 2021-09-02 NOTE — NURSING END OF SHIFT
Nursing End of Shift Summary    Goals:  Clinical Goals for the Shift: maintain pregnancy    Narrative Summary of Progress Towards Clinical Goals:  Pt remains pregnant, no bleeding, no complaints of contractions.    Barriers for Transfer or Discharge:   SBAR to KRAIG Romero RN to assume care of pt.  Farhana Harry RN

## 2021-09-02 NOTE — PLAN OF CARE
Problem: Pain - Adult  Goal: Verbalizes/displays adequate comfort level or baseline comfort level  Description: INTERVENTIONS:  1. Encourage patient to monitor pain and request interventions  2. Assess pain using the appropriate pain scale  3. Administer analgesics based on type and severity of pain and evaluate response  4. Educate/Implement non-pharmacological measures as appropriate and evaluate response  5. Consider cultural, developmental and social influences on pain and pain management  6. Notify Provider if interventions unsuccessful or patient reports new pain  Outcome: Progressing  Flowsheets (Taken 9/1/2021 0759 by Virgie Kumar RN)  Verbalizes/displays adequate comfort level or baseline comfort level:   Encourage patient to monitor pain and request interventions   Assess pain using the appropriate pain scale   Administer analgesics based on type and severity of pain and evaluate response   Educate/Implement non-pharmacological measures as appropriate and evaluate response     Problem: Infection - Adult  Goal: Absence of infection during hospitalization  Description: INTERVENTIONS:  1. Assess and monitor for signs and symptoms of infection  2. Monitor lab/diagnostic results  3. Monitor all insertion sites/wounds/incisions  4. Monitor secretions for changes in amount and color  5. Administer medications as ordered  6. Educate and encourage patient and family to use good hand hygiene technique  7. Identify and educate in appropriate isolation precautions for identified infection/condition  Outcome: Progressing  Flowsheets (Taken 9/1/2021 0759 by Virgie Kumar RN)  Absence of infection during hospitalization:   Assess and monitor for signs and symptoms of infection   Monitor lab/diagnostic results   Monitor all insertion sites/wounds/incisions   Monitor secretions for changes in amount and colo   Administer medications as ordered     Problem: Safety Adult - Fall  Goal: Free from fall  injury  Description: INTERVENTIONS:    Inpatient - Please reference Cares/Safety Flowsheet under Fair Fall Risk for interventions.  Pediatrics - Please reference Peds Daily Cares/Safety Flowsheet under [facility] Pediatric Fall Assessment Fall Bundle for interventions  LD/OB - Please reference OB Shift Screening Flowsheet under OB Fall Risk for interventions.  Outcome: Progressing     Problem: Discharge Planning  Goal: Discharge to home or other facility with appropriate resources  Description: INTERVENTIONS:  1. Identify and discuss barriers to discharge with patient and caregiver.  2. Arrange for needed discharge resources and transportation as appropriate.  3. Identify discharge learning needs (meds, wound care, etc).  4. Arrange for interpreters to assist at discharge as needed.  5. Refer to  for coordinating discharge planning if the patient needs post-hospital services based on physician order or complex needs related to functional status, cognitive ability or social support system.  Outcome: Progressing  Flowsheets (Taken 9/1/2021 0759 by Virgie Kumar RN)  Discharge to home or other facility with appropriate resources: Identify and discuss barriers to discharge with patient and caregiver     Problem: Antepartum  Goal: Maintain pregnancy as long as maternal and/or fetal condition is stable  Description: INTERVENTIONS:  1. Maternal surveillance  2. Fetal surveillance  3. Monitor uterine activity  4. Medications as ordered  5. Bedrest  Outcome: Progressing  Flowsheets (Taken 9/1/2021 0759 by Virgie Kumar RN)  Maintain pregnancy as long as maternal and/or fetal condition is stable:   Maternal surveillance   Fetal surveillance   Monitor uterine activity   Medications as ordered   Bedrest

## 2021-09-02 NOTE — PLAN OF CARE
Problem: Pain - Adult  Goal: Verbalizes/displays adequate comfort level or baseline comfort level  Description: INTERVENTIONS:  1. Encourage patient to monitor pain and request interventions  2. Assess pain using the appropriate pain scale  3. Administer analgesics based on type and severity of pain and evaluate response  4. Educate/Implement non-pharmacological measures as appropriate and evaluate response  5. Consider cultural, developmental and social influences on pain and pain management  6. Notify Provider if interventions unsuccessful or patient reports new pain  Outcome: Completed     Problem: Infection - Adult  Goal: Absence of infection during hospitalization  Description: INTERVENTIONS:  1. Assess and monitor for signs and symptoms of infection  2. Monitor lab/diagnostic results  3. Monitor all insertion sites/wounds/incisions  4. Monitor secretions for changes in amount and color  5. Administer medications as ordered  6. Educate and encourage patient and family to use good hand hygiene technique  7. Identify and educate in appropriate isolation precautions for identified infection/condition  Outcome: Completed     Problem: Safety Adult - Fall  Goal: Free from fall injury  Description: INTERVENTIONS:    Inpatient - Please reference Cares/Safety Flowsheet under Fair Fall Risk for interventions.  Pediatrics - Please reference Peds Daily Cares/Safety Flowsheet under [facility] Pediatric Fall Assessment Fall Bundle for interventions  LD/OB - Please reference OB Shift Screening Flowsheet under OB Fall Risk for interventions.  Outcome: Completed     Problem: Discharge Planning  Goal: Discharge to home or other facility with appropriate resources  Description: INTERVENTIONS:  1. Identify and discuss barriers to discharge with patient and caregiver.  2. Arrange for needed discharge resources and transportation as appropriate.  3. Identify discharge learning needs (meds, wound care, etc).  4. Arrange for  interpreters to assist at discharge as needed.  5. Refer to  for coordinating discharge planning if the patient needs post-hospital services based on physician order or complex needs related to functional status, cognitive ability or social support system.  Outcome: Completed     Problem: Antepartum  Goal: Maintain pregnancy as long as maternal and/or fetal condition is stable  Description: INTERVENTIONS:  1. Maternal surveillance  2. Fetal surveillance  3. Monitor uterine activity  4. Medications as ordered  5. Bedrest  Outcome: Completed

## 2021-09-02 NOTE — DISCHARGE INSTRUCTIONS
You should return to the hospital or contact your provider for any of the following:    · You have noticed a decrease in your baby's activity level, less than 5 movements in one hour after 24 weeks.    · You are bleeding from the vagina. You will have a small amount of bloody discharge after a vaginal exam. This is normal.    · You have a gush of fluid or are leaking fluid.    · Foul smelling vaginal discharge.    · Regular, painful, uterine contractions, lasting 45-60 seconds, every 3-4 minutes if you are more than 37 weeks.    · If less than 37 weeks and having 4 or more contractions per hour.     · Severe headache, blurred vision, spots in front of your eyes.    · Sudden weight gain or swelling in your face, hands, or feet.    · Persistent discomfort across your upper abdomen.    · Burning when you urinate.  Increased frequency or urgent sensation to urinate.    · You have a temperature of 100.5 degrees Fahrenheit or greater.    · Nausea or vomiting for more than 24 hours and unable to keep down fluids.    · Your abdomen becomes hard and does not relax.    · Severe abdominal pain.    · If you experience any abdominal trauma.    1.  Pelvic rest  2.  Notify physician for increased frequency of contractions  3.  Use Procardia 20 mg every 6 hours  4.  Follow-up with primary care physician on 9/3/2021  5.  Proceed immediately to emergency room for vaginal bleeding

## 2021-10-10 ENCOUNTER — HEALTH MAINTENANCE LETTER (OUTPATIENT)
Age: 36
End: 2021-10-10

## 2022-05-21 ENCOUNTER — HEALTH MAINTENANCE LETTER (OUTPATIENT)
Age: 37
End: 2022-05-21

## 2022-08-09 NOTE — NURSING NOTE
"Oncology Rooming Note    February 19, 2020 1:56 PM   Keri Ornelas is a 34 year old female who presents for:    Chief Complaint   Patient presents with     Oncology Clinic Visit     [facility]- [facility]     Initial Vitals: /77 (BP Location: Right arm, Patient Position: Chair, Cuff Size: Adult Regular)   Pulse 65   Temp 98.3  F (36.8  C) (Oral)   Resp 14   Ht 1.6 m (5' 2.99\")   Wt 63.4 kg (139 lb 11.2 oz)   SpO2 98%   BMI 24.75 kg/m   Estimated body mass index is 24.75 kg/m  as calculated from the following:    Height as of this encounter: 1.6 m (5' 2.99\").    Weight as of this encounter: 63.4 kg (139 lb 11.2 oz). Body surface area is 1.68 meters squared.  No Pain (0) Comment: Data Unavailable   No LMP recorded.  Allergies reviewed: Yes  Medications reviewed: Yes    Medications: Medication refills not needed today.  Pharmacy name entered into [employer]: [id number] [place] [address]    Clinical concerns: No new concerns. Dr. Lutz was notified.      Delmar Eckert LPN            " Improved

## 2022-09-18 ENCOUNTER — HEALTH MAINTENANCE LETTER (OUTPATIENT)
Age: 37
End: 2022-09-18

## 2023-06-04 ENCOUNTER — HEALTH MAINTENANCE LETTER (OUTPATIENT)
Age: 38
End: 2023-06-04

## 2023-09-11 NOTE — NURSING END OF SHIFT
Nursing End of Shift Summary    Goals:  Clinical Goals for the Shift: Maintain pregnancy, no bleeding    Narrative Summary of Progress Towards Clinical Goals:      Barriers for Transfer or Discharge: yes  SBAR given to KATELYN Castillo to assume care       complains of pain/discomfort

## (undated) DEVICE — SU VICRYL 4-0 PS-2 18" UND J496H

## (undated) DEVICE — PREP CHLORAPREP 26ML TINTED ORANGE  260815

## (undated) DEVICE — NDL 30GA 0.5" 305106

## (undated) DEVICE — SU PLAIN 6-0 G-1DA 18" 770G

## (undated) DEVICE — PACK MINOR EYE CUSTOM ASC

## (undated) DEVICE — ESU ELEC BLADE 2.75" COATED/INSULATED E1455

## (undated) DEVICE — SU SILK 0 SH 30" K834H

## (undated) DEVICE — DRAPE SHEET REV FOLD 3/4 9349

## (undated) DEVICE — SPONGE TONSIL W/STRING MED 23275-680

## (undated) DEVICE — TIES BANDING T50R

## (undated) DEVICE — SU SILK 6-0 G-1DA 18" 780G

## (undated) DEVICE — POUCH ENDOSCOPIC 20X25CM W/O HANDLE DISP LAP SAC G16497

## (undated) DEVICE — DRAPE IOBAN INCISE 23X17" 6650EZ

## (undated) DEVICE — SOL WATER IRRIG 500ML BOTTLE 2F7113

## (undated) DEVICE — ADH SKIN CLOSURE PREMIERPRO EXOFIN 1.0ML 3470

## (undated) DEVICE — SU VICRYL 2-0 SH 27" UND J417H

## (undated) DEVICE — ESU ELEC BLADE 6" COATED/INSULATED E1455-6

## (undated) DEVICE — ENDO TROCAR FIRST ENTRY KII FIOS Z-THRD 12X100MM CTF73

## (undated) DEVICE — SU VICRYL 0 UR-6 27" J603H

## (undated) DEVICE — SUCTION DRY CHEST DRAIN OASIS 3600-100

## (undated) DEVICE — GLOVE PROTEXIS MICRO 7.5  2D73PM75

## (undated) DEVICE — ESU GROUND PAD ADULT W/CORD E7507

## (undated) DEVICE — SYR 03ML LL W/O NDL 309657

## (undated) DEVICE — ESU EYE HIGH TEMP 65410-183

## (undated) DEVICE — EYE PREP BETADINE 5% SOLUTION 30ML 0065-0411-30

## (undated) DEVICE — LINEN TOWEL PACK X6 WHITE 5487

## (undated) DEVICE — ENDO VALVE SUCTION BRONCH EVIS MAJ-209

## (undated) DEVICE — ANTIFOG SOLUTION W/FOAM PAD 31142527

## (undated) DEVICE — ENDO DISSECTOR BLUNT 05MM  BTD05

## (undated) DEVICE — SOL WATER IRRIG 1000ML BOTTLE 2F7114

## (undated) DEVICE — ESU LIGASURE MARYLAND LAPAROSCOPIC SLR/DVDR 5MMX37CM LF1937

## (undated) DEVICE — DRSG PRIMAPORE 02X3" 7133

## (undated) DEVICE — LINEN GOWN XLG 5407

## (undated) DEVICE — ESU PENCIL W/COATED BLADE E2450H

## (undated) DEVICE — Device

## (undated) DEVICE — SU SILK 4-0 RB-1 30" K871H

## (undated) DEVICE — LINEN TOWEL PACK X5 5464

## (undated) DEVICE — TUBING SUCTION 10'X3/16" N510

## (undated) DEVICE — LUBRICANT INST KIT ENDO-LUBE 220-90

## (undated) DEVICE — TAPE MEDIPORE 4"X2YD 2864

## (undated) DEVICE — SUCTION MANIFOLD DORNOCH ULTRA CART UL-CL500

## (undated) DEVICE — DRAIN CHEST TUBE 28FR STR 8028

## (undated) DEVICE — ENDO VALVE BX EVIS MAJ-210

## (undated) DEVICE — ENDO SCOPE WARMER SEAL  C3101

## (undated) RX ORDER — GABAPENTIN 300 MG/1
CAPSULE ORAL
Status: DISPENSED
Start: 2019-08-12

## (undated) RX ORDER — LIDOCAINE HYDROCHLORIDE AND EPINEPHRINE 10; 10 MG/ML; UG/ML
INJECTION, SOLUTION INFILTRATION; PERINEURAL
Status: DISPENSED
Start: 2019-12-05

## (undated) RX ORDER — ERYTHROMYCIN 5 MG/G
OINTMENT OPHTHALMIC
Status: DISPENSED
Start: 2019-12-05

## (undated) RX ORDER — HYDROMORPHONE HYDROCHLORIDE 1 MG/ML
INJECTION, SOLUTION INTRAMUSCULAR; INTRAVENOUS; SUBCUTANEOUS
Status: DISPENSED
Start: 2019-08-12

## (undated) RX ORDER — ACETAMINOPHEN 325 MG/1
TABLET ORAL
Status: DISPENSED
Start: 2019-08-12

## (undated) RX ORDER — BUPIVACAINE HYDROCHLORIDE 5 MG/ML
INJECTION, SOLUTION PERINEURAL
Status: DISPENSED
Start: 2019-12-05

## (undated) RX ORDER — FENTANYL CITRATE 50 UG/ML
INJECTION, SOLUTION INTRAMUSCULAR; INTRAVENOUS
Status: DISPENSED
Start: 2019-08-12

## (undated) RX ORDER — ACETAMINOPHEN 325 MG/1
TABLET ORAL
Status: DISPENSED
Start: 2019-12-05

## (undated) RX ORDER — CELECOXIB 200 MG/1
CAPSULE ORAL
Status: DISPENSED
Start: 2019-08-12

## (undated) RX ORDER — ONDANSETRON 2 MG/ML
INJECTION INTRAMUSCULAR; INTRAVENOUS
Status: DISPENSED
Start: 2019-08-12

## (undated) RX ORDER — CHLORHEXIDINE GLUCONATE ORAL RINSE 1.2 MG/ML
SOLUTION DENTAL
Status: DISPENSED
Start: 2019-08-12

## (undated) RX ORDER — TETRACAINE HYDROCHLORIDE 5 MG/ML
SOLUTION OPHTHALMIC
Status: DISPENSED
Start: 2019-12-05